# Patient Record
Sex: MALE | Race: WHITE | Employment: OTHER | URBAN - NONMETROPOLITAN AREA
[De-identification: names, ages, dates, MRNs, and addresses within clinical notes are randomized per-mention and may not be internally consistent; named-entity substitution may affect disease eponyms.]

---

## 2017-01-04 ENCOUNTER — OFFICE VISIT (OUTPATIENT)
Dept: CARDIOLOGY | Age: 72
End: 2017-01-04
Payer: MEDICARE

## 2017-01-04 VITALS
HEART RATE: 76 BPM | SYSTOLIC BLOOD PRESSURE: 128 MMHG | HEIGHT: 73 IN | DIASTOLIC BLOOD PRESSURE: 86 MMHG | WEIGHT: 178 LBS | BODY MASS INDEX: 23.59 KG/M2

## 2017-01-04 DIAGNOSIS — I25.10 ASHD (ARTERIOSCLEROTIC HEART DISEASE): Primary | ICD-10-CM

## 2017-01-04 DIAGNOSIS — I10 ESSENTIAL HYPERTENSION: ICD-10-CM

## 2017-01-04 PROCEDURE — 99213 OFFICE O/P EST LOW 20 MIN: CPT | Performed by: INTERNAL MEDICINE

## 2017-01-04 RX ORDER — CETIRIZINE HYDROCHLORIDE 10 MG/1
TABLET ORAL
COMMUNITY
Start: 2016-12-30 | End: 2017-05-10

## 2017-01-04 RX ORDER — AMOXICILLIN AND CLAVULANATE POTASSIUM 875; 125 MG/1; MG/1
TABLET, FILM COATED ORAL
COMMUNITY
Start: 2016-12-30 | End: 2017-05-10

## 2017-01-12 DIAGNOSIS — I25.119 CORONARY ARTERY DISEASE INVOLVING NATIVE CORONARY ARTERY OF NATIVE HEART WITH ANGINA PECTORIS (HCC): Primary | ICD-10-CM

## 2017-01-13 RX ORDER — CLOPIDOGREL BISULFATE 75 MG/1
TABLET ORAL
Qty: 30 TABLET | Refills: 5 | Status: SHIPPED | OUTPATIENT
Start: 2017-01-13 | End: 2017-05-10

## 2017-01-18 ENCOUNTER — TELEPHONE (OUTPATIENT)
Dept: CARDIOLOGY | Age: 72
End: 2017-01-18

## 2017-01-20 ENCOUNTER — TELEPHONE (OUTPATIENT)
Dept: CARDIOLOGY | Age: 72
End: 2017-01-20

## 2017-05-04 DIAGNOSIS — I10 ESSENTIAL HYPERTENSION: ICD-10-CM

## 2017-05-04 RX ORDER — AMLODIPINE BESYLATE 5 MG/1
TABLET ORAL
Qty: 90 TABLET | Refills: 2 | Status: SHIPPED | OUTPATIENT
Start: 2017-05-04 | End: 2018-04-25 | Stop reason: SDUPTHER

## 2017-05-10 ENCOUNTER — APPOINTMENT (OUTPATIENT)
Dept: GENERAL RADIOLOGY | Age: 72
End: 2017-05-10
Payer: MEDICARE

## 2017-05-10 ENCOUNTER — HOSPITAL ENCOUNTER (OUTPATIENT)
Age: 72
Setting detail: OBSERVATION
Discharge: HOME | End: 2017-05-12
Attending: EMERGENCY MEDICINE | Admitting: INTERNAL MEDICINE
Payer: MEDICARE

## 2017-05-10 ENCOUNTER — TELEPHONE (OUTPATIENT)
Dept: CARDIOLOGY | Age: 72
End: 2017-05-10

## 2017-05-10 DIAGNOSIS — I20.0 UNSTABLE ANGINA (HCC): Primary | ICD-10-CM

## 2017-05-10 LAB
ALBUMIN SERPL-MCNC: 4.6 G/DL (ref 3.5–5.2)
ALP BLD-CCNC: 68 U/L (ref 40–130)
ALT SERPL-CCNC: 22 U/L (ref 5–41)
ANION GAP SERPL CALCULATED.3IONS-SCNC: 14 MMOL/L (ref 7–19)
APTT: 31.2 SEC (ref 26–36.2)
AST SERPL-CCNC: 25 U/L (ref 5–40)
BASOPHILS ABSOLUTE: 0 K/UL (ref 0–0.2)
BASOPHILS RELATIVE PERCENT: 0.4 % (ref 0–1)
BILIRUB SERPL-MCNC: 0.6 MG/DL (ref 0.2–1.2)
BUN BLDV-MCNC: 13 MG/DL (ref 8–23)
CALCIUM SERPL-MCNC: 9.6 MG/DL (ref 8.8–10.2)
CHLORIDE BLD-SCNC: 97 MMOL/L (ref 98–111)
CO2: 23 MMOL/L (ref 22–29)
CREAT SERPL-MCNC: 0.7 MG/DL (ref 0.5–1.2)
EOSINOPHILS ABSOLUTE: 0 K/UL (ref 0–0.6)
EOSINOPHILS RELATIVE PERCENT: 0.4 % (ref 0–5)
GFR NON-AFRICAN AMERICAN: >60
GLOBULIN: 2.7 G/DL
GLUCOSE BLD-MCNC: 103 MG/DL (ref 74–109)
HCT VFR BLD CALC: 40.3 % (ref 42–52)
HCT VFR BLD CALC: 42.7 % (ref 42–52)
HEMOGLOBIN: 13.3 G/DL (ref 14–18)
HEMOGLOBIN: 14.1 G/DL (ref 14–18)
LYMPHOCYTES ABSOLUTE: 1.6 K/UL (ref 1.1–4.5)
LYMPHOCYTES RELATIVE PERCENT: 20.3 % (ref 20–40)
MCH RBC QN AUTO: 29.1 PG (ref 27–31)
MCH RBC QN AUTO: 29.4 PG (ref 27–31)
MCHC RBC AUTO-ENTMCNC: 33 G/DL (ref 33–37)
MCHC RBC AUTO-ENTMCNC: 33 G/DL (ref 33–37)
MCV RBC AUTO: 88.2 FL (ref 80–94)
MCV RBC AUTO: 89 FL (ref 80–94)
MONOCYTES ABSOLUTE: 1 K/UL (ref 0–0.9)
MONOCYTES RELATIVE PERCENT: 12.6 % (ref 0–10)
NEUTROPHILS ABSOLUTE: 5.1 K/UL (ref 1.5–7.5)
NEUTROPHILS RELATIVE PERCENT: 65.9 % (ref 50–65)
PDW BLD-RTO: 12.7 % (ref 11.5–14.5)
PDW BLD-RTO: 12.9 % (ref 11.5–14.5)
PERFORMED ON: NORMAL
PLATELET # BLD: 185 K/UL (ref 130–400)
PLATELET # BLD: 212 K/UL (ref 130–400)
PMV BLD AUTO: 10.3 FL (ref 7.4–10.4)
PMV BLD AUTO: 9.8 FL (ref 7.4–10.4)
POC TROPONIN I: 0.02 NG/ML (ref 0–0.08)
POTASSIUM SERPL-SCNC: 4 MMOL/L (ref 3.5–5)
RBC # BLD: 4.53 M/UL (ref 4.7–6.1)
RBC # BLD: 4.84 M/UL (ref 4.7–6.1)
SODIUM BLD-SCNC: 134 MMOL/L (ref 136–145)
TOTAL PROTEIN: 7.3 G/DL (ref 6.6–8.7)
TROPONIN: <0.01 NG/ML (ref 0–0.03)
WBC # BLD: 6.2 K/UL (ref 4.8–10.8)
WBC # BLD: 7.7 K/UL (ref 4.8–10.8)

## 2017-05-10 PROCEDURE — 6360000002 HC RX W HCPCS: Performed by: INTERNAL MEDICINE

## 2017-05-10 PROCEDURE — 99283 EMERGENCY DEPT VISIT LOW MDM: CPT | Performed by: EMERGENCY MEDICINE

## 2017-05-10 PROCEDURE — 99285 EMERGENCY DEPT VISIT HI MDM: CPT

## 2017-05-10 PROCEDURE — 93005 ELECTROCARDIOGRAM TRACING: CPT

## 2017-05-10 PROCEDURE — 85730 THROMBOPLASTIN TIME PARTIAL: CPT

## 2017-05-10 PROCEDURE — 6370000000 HC RX 637 (ALT 250 FOR IP): Performed by: INTERNAL MEDICINE

## 2017-05-10 PROCEDURE — 85025 COMPLETE CBC W/AUTO DIFF WBC: CPT

## 2017-05-10 PROCEDURE — 2140000000 HC CCU INTERMEDIATE R&B

## 2017-05-10 PROCEDURE — 85027 COMPLETE CBC AUTOMATED: CPT

## 2017-05-10 PROCEDURE — 84484 ASSAY OF TROPONIN QUANT: CPT

## 2017-05-10 PROCEDURE — 94762 N-INVAS EAR/PLS OXIMTRY CONT: CPT

## 2017-05-10 PROCEDURE — 6370000000 HC RX 637 (ALT 250 FOR IP): Performed by: EMERGENCY MEDICINE

## 2017-05-10 PROCEDURE — 80053 COMPREHEN METABOLIC PANEL: CPT

## 2017-05-10 PROCEDURE — 71010 XR CHEST PORTABLE: CPT

## 2017-05-10 PROCEDURE — G0378 HOSPITAL OBSERVATION PER HR: HCPCS

## 2017-05-10 PROCEDURE — 2580000003 HC RX 258: Performed by: INTERNAL MEDICINE

## 2017-05-10 PROCEDURE — 36415 COLL VENOUS BLD VENIPUNCTURE: CPT

## 2017-05-10 RX ORDER — ASPIRIN 81 MG/1
324 TABLET, CHEWABLE ORAL ONCE
Status: COMPLETED | OUTPATIENT
Start: 2017-05-10 | End: 2017-05-10

## 2017-05-10 RX ORDER — SODIUM CHLORIDE 0.9 % (FLUSH) 0.9 %
10 SYRINGE (ML) INJECTION EVERY 12 HOURS SCHEDULED
Status: DISCONTINUED | OUTPATIENT
Start: 2017-05-10 | End: 2017-05-11

## 2017-05-10 RX ORDER — ACETAMINOPHEN 325 MG/1
650 TABLET ORAL EVERY 4 HOURS PRN
Status: DISCONTINUED | OUTPATIENT
Start: 2017-05-10 | End: 2017-05-12 | Stop reason: HOSPADM

## 2017-05-10 RX ORDER — NITROGLYCERIN 0.4 MG/1
0.4 TABLET SUBLINGUAL EVERY 5 MIN PRN
Status: DISCONTINUED | OUTPATIENT
Start: 2017-05-10 | End: 2017-05-12 | Stop reason: HOSPADM

## 2017-05-10 RX ORDER — DOCUSATE SODIUM 100 MG/1
100 CAPSULE, LIQUID FILLED ORAL 2 TIMES DAILY
Status: DISCONTINUED | OUTPATIENT
Start: 2017-05-10 | End: 2017-05-12 | Stop reason: HOSPADM

## 2017-05-10 RX ORDER — SODIUM CHLORIDE 9 MG/ML
INJECTION, SOLUTION INTRAVENOUS CONTINUOUS
Status: DISCONTINUED | OUTPATIENT
Start: 2017-05-10 | End: 2017-05-11 | Stop reason: SDUPTHER

## 2017-05-10 RX ORDER — HEPARIN SODIUM 10000 [USP'U]/100ML
12 INJECTION, SOLUTION INTRAVENOUS CONTINUOUS
Status: DISCONTINUED | OUTPATIENT
Start: 2017-05-10 | End: 2017-05-11

## 2017-05-10 RX ORDER — HEPARIN SODIUM 1000 [USP'U]/ML
4000 INJECTION, SOLUTION INTRAVENOUS; SUBCUTANEOUS ONCE
Status: COMPLETED | OUTPATIENT
Start: 2017-05-10 | End: 2017-05-10

## 2017-05-10 RX ORDER — ONDANSETRON 2 MG/ML
4 INJECTION INTRAMUSCULAR; INTRAVENOUS EVERY 6 HOURS PRN
Status: DISCONTINUED | OUTPATIENT
Start: 2017-05-10 | End: 2017-05-12 | Stop reason: HOSPADM

## 2017-05-10 RX ORDER — HEPARIN SODIUM 1000 [USP'U]/ML
2000 INJECTION, SOLUTION INTRAVENOUS; SUBCUTANEOUS PRN
Status: DISCONTINUED | OUTPATIENT
Start: 2017-05-10 | End: 2017-05-11

## 2017-05-10 RX ORDER — SODIUM CHLORIDE 0.9 % (FLUSH) 0.9 %
10 SYRINGE (ML) INJECTION PRN
Status: DISCONTINUED | OUTPATIENT
Start: 2017-05-10 | End: 2017-05-11

## 2017-05-10 RX ORDER — ASPIRIN 81 MG/1
81 TABLET, CHEWABLE ORAL DAILY
Status: DISCONTINUED | OUTPATIENT
Start: 2017-05-11 | End: 2017-05-12 | Stop reason: HOSPADM

## 2017-05-10 RX ORDER — METOPROLOL SUCCINATE 50 MG/1
50 TABLET, EXTENDED RELEASE ORAL DAILY
Status: DISCONTINUED | OUTPATIENT
Start: 2017-05-11 | End: 2017-05-12 | Stop reason: HOSPADM

## 2017-05-10 RX ORDER — HEPARIN SODIUM 1000 [USP'U]/ML
4000 INJECTION, SOLUTION INTRAVENOUS; SUBCUTANEOUS PRN
Status: DISCONTINUED | OUTPATIENT
Start: 2017-05-10 | End: 2017-05-11

## 2017-05-10 RX ORDER — ATORVASTATIN CALCIUM 20 MG/1
20 TABLET, FILM COATED ORAL NIGHTLY
Status: DISCONTINUED | OUTPATIENT
Start: 2017-05-10 | End: 2017-05-12 | Stop reason: HOSPADM

## 2017-05-10 RX ORDER — FERROUS SULFATE 325(65) MG
325 TABLET ORAL 2 TIMES DAILY WITH MEALS
Status: DISCONTINUED | OUTPATIENT
Start: 2017-05-11 | End: 2017-05-12 | Stop reason: HOSPADM

## 2017-05-10 RX ORDER — OMEGA-3 FATTY ACIDS CAP DELAYED RELEASE 1000 MG 1000 MG
2000 CAPSULE DELAYED RELEASE ORAL DAILY
Status: DISCONTINUED | OUTPATIENT
Start: 2017-05-11 | End: 2017-05-12 | Stop reason: HOSPADM

## 2017-05-10 RX ORDER — RAMIPRIL 10 MG/1
10 CAPSULE ORAL 2 TIMES DAILY
Status: DISCONTINUED | OUTPATIENT
Start: 2017-05-10 | End: 2017-05-12 | Stop reason: HOSPADM

## 2017-05-10 RX ORDER — DIPHENHYDRAMINE HCL 25 MG
50 TABLET ORAL NIGHTLY PRN
Status: DISCONTINUED | OUTPATIENT
Start: 2017-05-10 | End: 2017-05-12 | Stop reason: HOSPADM

## 2017-05-10 RX ORDER — AMLODIPINE BESYLATE 5 MG/1
5 TABLET ORAL DAILY
Status: DISCONTINUED | OUTPATIENT
Start: 2017-05-11 | End: 2017-05-12 | Stop reason: HOSPADM

## 2017-05-10 RX ADMIN — ATORVASTATIN CALCIUM 20 MG: 20 TABLET, FILM COATED ORAL at 22:14

## 2017-05-10 RX ADMIN — HEPARIN SODIUM AND DEXTROSE 9.9 ML/HR: 10000; 5 INJECTION INTRAVENOUS at 22:37

## 2017-05-10 RX ADMIN — ASPIRIN 81 MG CHEWABLE TABLET 324 MG: 81 TABLET CHEWABLE at 18:53

## 2017-05-10 RX ADMIN — Medication 10 ML: at 22:16

## 2017-05-10 RX ADMIN — SODIUM CHLORIDE: 9 INJECTION, SOLUTION INTRAVENOUS at 22:20

## 2017-05-10 RX ADMIN — RAMIPRIL 10 MG: 10 CAPSULE ORAL at 22:29

## 2017-05-10 RX ADMIN — DOCUSATE SODIUM 100 MG: 100 CAPSULE, LIQUID FILLED ORAL at 22:14

## 2017-05-10 RX ADMIN — HEPARIN SODIUM 10000 UNITS: 1000 INJECTION, SOLUTION INTRAVENOUS; SUBCUTANEOUS at 22:15

## 2017-05-10 ASSESSMENT — ENCOUNTER SYMPTOMS
SHORTNESS OF BREATH: 1
NAUSEA: 0
VOMITING: 0

## 2017-05-10 ASSESSMENT — PAIN SCALES - GENERAL: PAINLEVEL_OUTOF10: 0

## 2017-05-11 LAB
ANION GAP SERPL CALCULATED.3IONS-SCNC: 12 MMOL/L (ref 7–19)
APTT: 50.9 SEC (ref 26–36.2)
APTT: 52.4 SEC (ref 26–36.2)
APTT: 52.4 SEC (ref 26–36.2)
APTT: 52.8 SEC (ref 26–36.2)
BASOPHILS ABSOLUTE: 0 K/UL (ref 0–0.2)
BASOPHILS RELATIVE PERCENT: 0.5 % (ref 0–1)
BUN BLDV-MCNC: 13 MG/DL (ref 8–23)
CALCIUM SERPL-MCNC: 8.9 MG/DL (ref 8.8–10.2)
CHLORIDE BLD-SCNC: 102 MMOL/L (ref 98–111)
CO2: 25 MMOL/L (ref 22–29)
CREAT SERPL-MCNC: 0.7 MG/DL (ref 0.5–1.2)
EOSINOPHILS ABSOLUTE: 0.1 K/UL (ref 0–0.6)
EOSINOPHILS RELATIVE PERCENT: 2 % (ref 0–5)
GFR NON-AFRICAN AMERICAN: >60
GLUCOSE BLD-MCNC: 103 MG/DL (ref 74–109)
HCT VFR BLD CALC: 39.4 % (ref 42–52)
HEMOGLOBIN: 12.8 G/DL (ref 14–18)
INR BLD: 1.11 (ref 0.88–1.18)
LYMPHOCYTES ABSOLUTE: 1.7 K/UL (ref 1.1–4.5)
LYMPHOCYTES RELATIVE PERCENT: 26.5 % (ref 20–40)
MCH RBC QN AUTO: 29.2 PG (ref 27–31)
MCHC RBC AUTO-ENTMCNC: 32.5 G/DL (ref 33–37)
MCV RBC AUTO: 90 FL (ref 80–94)
MONOCYTES ABSOLUTE: 0.8 K/UL (ref 0–0.9)
MONOCYTES RELATIVE PERCENT: 12.1 % (ref 0–10)
NEUTROPHILS ABSOLUTE: 3.8 K/UL (ref 1.5–7.5)
NEUTROPHILS RELATIVE PERCENT: 58.6 % (ref 50–65)
PDW BLD-RTO: 13.1 % (ref 11.5–14.5)
PLATELET # BLD: 180 K/UL (ref 130–400)
PMV BLD AUTO: 10.5 FL (ref 7.4–10.4)
POC ACT LR: 118 SEC
POC ACT LR: 121 SEC
POC ACT LR: 158 SEC
POC ACT LR: 300 SEC
POTASSIUM SERPL-SCNC: 4 MMOL/L (ref 3.5–5)
PROTHROMBIN TIME: 14.2 SEC (ref 12–14.6)
RBC # BLD: 4.38 M/UL (ref 4.7–6.1)
SODIUM BLD-SCNC: 139 MMOL/L (ref 136–145)
TROPONIN: <0.01 NG/ML (ref 0–0.03)
TROPONIN: <0.01 NG/ML (ref 0–0.03)
WBC # BLD: 6.4 K/UL (ref 4.8–10.8)

## 2017-05-11 PROCEDURE — 85730 THROMBOPLASTIN TIME PARTIAL: CPT

## 2017-05-11 PROCEDURE — 85025 COMPLETE CBC W/AUTO DIFF WBC: CPT

## 2017-05-11 PROCEDURE — C1874 STENT, COATED/COV W/DEL SYS: HCPCS

## 2017-05-11 PROCEDURE — 2580000003 HC RX 258: Performed by: INTERNAL MEDICINE

## 2017-05-11 PROCEDURE — C1769 GUIDE WIRE: HCPCS

## 2017-05-11 PROCEDURE — 92928 PRQ TCAT PLMT NTRAC ST 1 LES: CPT | Performed by: INTERNAL MEDICINE

## 2017-05-11 PROCEDURE — 2720000000 HC MISC SURG SUPPLY STERILE $0-50

## 2017-05-11 PROCEDURE — 6370000000 HC RX 637 (ALT 250 FOR IP): Performed by: INTERNAL MEDICINE

## 2017-05-11 PROCEDURE — 84484 ASSAY OF TROPONIN QUANT: CPT

## 2017-05-11 PROCEDURE — 80061 LIPID PANEL: CPT

## 2017-05-11 PROCEDURE — G0378 HOSPITAL OBSERVATION PER HR: HCPCS

## 2017-05-11 PROCEDURE — 2720000001 HC MISC SURG SUPPLY STERILE $51-500

## 2017-05-11 PROCEDURE — C1894 INTRO/SHEATH, NON-LASER: HCPCS

## 2017-05-11 PROCEDURE — 85347 COAGULATION TIME ACTIVATED: CPT

## 2017-05-11 PROCEDURE — 99220 PR INITIAL OBSERVATION CARE/DAY 70 MINUTES: CPT | Performed by: INTERNAL MEDICINE

## 2017-05-11 PROCEDURE — 2709999900 HC NON-CHARGEABLE SUPPLY

## 2017-05-11 PROCEDURE — C1725 CATH, TRANSLUMIN NON-LASER: HCPCS

## 2017-05-11 PROCEDURE — 93005 ELECTROCARDIOGRAM TRACING: CPT

## 2017-05-11 PROCEDURE — 36415 COLL VENOUS BLD VENIPUNCTURE: CPT

## 2017-05-11 PROCEDURE — 94762 N-INVAS EAR/PLS OXIMTRY CONT: CPT

## 2017-05-11 PROCEDURE — 2500000003 HC RX 250 WO HCPCS

## 2017-05-11 PROCEDURE — 80048 BASIC METABOLIC PNL TOTAL CA: CPT

## 2017-05-11 PROCEDURE — 2140000000 HC CCU INTERMEDIATE R&B

## 2017-05-11 PROCEDURE — 93458 L HRT ARTERY/VENTRICLE ANGIO: CPT | Performed by: INTERNAL MEDICINE

## 2017-05-11 PROCEDURE — 85610 PROTHROMBIN TIME: CPT

## 2017-05-11 PROCEDURE — C1887 CATHETER, GUIDING: HCPCS

## 2017-05-11 PROCEDURE — 6370000000 HC RX 637 (ALT 250 FOR IP)

## 2017-05-11 PROCEDURE — 6360000002 HC RX W HCPCS

## 2017-05-11 RX ORDER — SODIUM CHLORIDE 9 MG/ML
INJECTION, SOLUTION INTRAVENOUS CONTINUOUS
Status: DISCONTINUED | OUTPATIENT
Start: 2017-05-11 | End: 2017-05-11

## 2017-05-11 RX ORDER — SODIUM CHLORIDE 0.9 % (FLUSH) 0.9 %
10 SYRINGE (ML) INJECTION PRN
Status: DISCONTINUED | OUTPATIENT
Start: 2017-05-11 | End: 2017-05-12 | Stop reason: HOSPADM

## 2017-05-11 RX ORDER — SODIUM CHLORIDE 9 MG/ML
INJECTION, SOLUTION INTRAVENOUS CONTINUOUS
Status: DISCONTINUED | OUTPATIENT
Start: 2017-05-11 | End: 2017-05-12 | Stop reason: HOSPADM

## 2017-05-11 RX ORDER — SODIUM CHLORIDE 0.9 % (FLUSH) 0.9 %
10 SYRINGE (ML) INJECTION EVERY 12 HOURS SCHEDULED
Status: DISCONTINUED | OUTPATIENT
Start: 2017-05-11 | End: 2017-05-12 | Stop reason: HOSPADM

## 2017-05-11 RX ORDER — CLOPIDOGREL BISULFATE 75 MG/1
75 TABLET ORAL DAILY
Status: DISCONTINUED | OUTPATIENT
Start: 2017-05-12 | End: 2017-05-12 | Stop reason: HOSPADM

## 2017-05-11 RX ADMIN — AMLODIPINE BESYLATE 5 MG: 5 TABLET ORAL at 08:40

## 2017-05-11 RX ADMIN — DOCUSATE SODIUM 100 MG: 100 CAPSULE, LIQUID FILLED ORAL at 20:33

## 2017-05-11 RX ADMIN — RAMIPRIL 10 MG: 10 CAPSULE ORAL at 20:37

## 2017-05-11 RX ADMIN — ASPIRIN 81 MG CHEWABLE TABLET 81 MG: 81 TABLET CHEWABLE at 08:40

## 2017-05-11 RX ADMIN — Medication 10 ML: at 20:34

## 2017-05-11 RX ADMIN — ATORVASTATIN CALCIUM 20 MG: 20 TABLET, FILM COATED ORAL at 20:34

## 2017-05-11 RX ADMIN — RAMIPRIL 10 MG: 10 CAPSULE ORAL at 08:40

## 2017-05-11 RX ADMIN — OMEGA-3 FATTY ACIDS CAP DELAYED RELEASE 1000 MG 2000 MG: 1000 CAPSULE DELAYED RELEASE at 08:40

## 2017-05-11 RX ADMIN — METOPROLOL SUCCINATE 50 MG: 50 TABLET, EXTENDED RELEASE ORAL at 08:40

## 2017-05-11 RX ADMIN — Medication 10 ML: at 08:41

## 2017-05-11 RX ADMIN — SODIUM CHLORIDE: 9 INJECTION, SOLUTION INTRAVENOUS at 20:34

## 2017-05-11 ASSESSMENT — PAIN SCALES - GENERAL
PAINLEVEL_OUTOF10: 0
PAINLEVEL_OUTOF10: 0

## 2017-05-11 ASSESSMENT — ENCOUNTER SYMPTOMS: SHORTNESS OF BREATH: 1

## 2017-05-12 VITALS
DIASTOLIC BLOOD PRESSURE: 86 MMHG | BODY MASS INDEX: 24.16 KG/M2 | HEIGHT: 72 IN | SYSTOLIC BLOOD PRESSURE: 130 MMHG | WEIGHT: 178.4 LBS | RESPIRATION RATE: 16 BRPM | TEMPERATURE: 98.1 F | OXYGEN SATURATION: 94 % | HEART RATE: 68 BPM

## 2017-05-12 LAB
ANION GAP SERPL CALCULATED.3IONS-SCNC: 13 MMOL/L (ref 7–19)
BASOPHILS ABSOLUTE: 0 K/UL (ref 0–0.2)
BASOPHILS RELATIVE PERCENT: 0.5 % (ref 0–1)
BUN BLDV-MCNC: 14 MG/DL (ref 8–23)
CALCIUM SERPL-MCNC: 8.4 MG/DL (ref 8.8–10.2)
CHLORIDE BLD-SCNC: 105 MMOL/L (ref 98–111)
CHOLESTEROL, TOTAL: 147 MG/DL (ref 160–199)
CO2: 23 MMOL/L (ref 22–29)
CREAT SERPL-MCNC: 0.7 MG/DL (ref 0.5–1.2)
EKG P AXIS: 21 DEGREES
EKG P AXIS: 21 DEGREES
EKG P-R INTERVAL: 168 MS
EKG P-R INTERVAL: 178 MS
EKG Q-T INTERVAL: 386 MS
EKG Q-T INTERVAL: 414 MS
EKG QRS DURATION: 88 MS
EKG QRS DURATION: 90 MS
EKG QTC CALCULATION (BAZETT): 401 MS
EKG QTC CALCULATION (BAZETT): 415 MS
EKG T AXIS: 23 DEGREES
EKG T AXIS: 38 DEGREES
EOSINOPHILS ABSOLUTE: 0.2 K/UL (ref 0–0.6)
EOSINOPHILS RELATIVE PERCENT: 2.5 % (ref 0–5)
GFR NON-AFRICAN AMERICAN: >60
GLUCOSE BLD-MCNC: 98 MG/DL (ref 74–109)
HCT VFR BLD CALC: 37.1 % (ref 42–52)
HDLC SERPL-MCNC: 63 MG/DL (ref 55–121)
HEMOGLOBIN: 12 G/DL (ref 14–18)
LDL CHOLESTEROL CALCULATED: 67 MG/DL
LYMPHOCYTES ABSOLUTE: 1.2 K/UL (ref 1.1–4.5)
LYMPHOCYTES RELATIVE PERCENT: 16 % (ref 20–40)
MCH RBC QN AUTO: 29.4 PG (ref 27–31)
MCHC RBC AUTO-ENTMCNC: 32.3 G/DL (ref 33–37)
MCV RBC AUTO: 90.9 FL (ref 80–94)
MONOCYTES ABSOLUTE: 0.7 K/UL (ref 0–0.9)
MONOCYTES RELATIVE PERCENT: 9.8 % (ref 0–10)
NEUTROPHILS ABSOLUTE: 5.4 K/UL (ref 1.5–7.5)
NEUTROPHILS RELATIVE PERCENT: 70.7 % (ref 50–65)
PDW BLD-RTO: 13.3 % (ref 11.5–14.5)
PLATELET # BLD: 167 K/UL (ref 130–400)
PLATELET # BLD: 172 K/UL (ref 130–400)
PMV BLD AUTO: 10 FL (ref 7.4–10.4)
POTASSIUM SERPL-SCNC: 4 MMOL/L (ref 3.5–5)
RBC # BLD: 4.08 M/UL (ref 4.7–6.1)
SODIUM BLD-SCNC: 141 MMOL/L (ref 136–145)
TRIGL SERPL-MCNC: 86 MG/DL (ref 150–199)
WBC # BLD: 7.6 K/UL (ref 4.8–10.8)

## 2017-05-12 PROCEDURE — 2580000003 HC RX 258: Performed by: INTERNAL MEDICINE

## 2017-05-12 PROCEDURE — 94762 N-INVAS EAR/PLS OXIMTRY CONT: CPT

## 2017-05-12 PROCEDURE — 85025 COMPLETE CBC W/AUTO DIFF WBC: CPT

## 2017-05-12 PROCEDURE — 6370000000 HC RX 637 (ALT 250 FOR IP): Performed by: INTERNAL MEDICINE

## 2017-05-12 PROCEDURE — 85049 AUTOMATED PLATELET COUNT: CPT

## 2017-05-12 PROCEDURE — 99217 PR OBSERVATION CARE DISCHARGE MANAGEMENT: CPT | Performed by: INTERNAL MEDICINE

## 2017-05-12 PROCEDURE — 80048 BASIC METABOLIC PNL TOTAL CA: CPT

## 2017-05-12 PROCEDURE — G0378 HOSPITAL OBSERVATION PER HR: HCPCS

## 2017-05-12 PROCEDURE — 36415 COLL VENOUS BLD VENIPUNCTURE: CPT

## 2017-05-12 RX ORDER — CLOPIDOGREL BISULFATE 75 MG/1
75 TABLET ORAL DAILY
Qty: 30 TABLET | Refills: 3 | Status: SHIPPED | OUTPATIENT
Start: 2017-05-12 | End: 2017-10-10 | Stop reason: SDUPTHER

## 2017-05-12 RX ADMIN — RAMIPRIL 10 MG: 10 CAPSULE ORAL at 08:43

## 2017-05-12 RX ADMIN — Medication 10 ML: at 08:44

## 2017-05-12 RX ADMIN — METOPROLOL SUCCINATE 50 MG: 50 TABLET, EXTENDED RELEASE ORAL at 08:43

## 2017-05-12 RX ADMIN — ASPIRIN 81 MG CHEWABLE TABLET 81 MG: 81 TABLET CHEWABLE at 08:44

## 2017-05-12 RX ADMIN — AMLODIPINE BESYLATE 5 MG: 5 TABLET ORAL at 08:44

## 2017-05-12 RX ADMIN — OMEGA-3 FATTY ACIDS CAP DELAYED RELEASE 1000 MG 2000 MG: 1000 CAPSULE DELAYED RELEASE at 08:43

## 2017-05-12 RX ADMIN — CLOPIDOGREL BISULFATE 75 MG: 75 TABLET ORAL at 08:44

## 2017-05-12 ASSESSMENT — PAIN SCALES - GENERAL
PAINLEVEL_OUTOF10: 0

## 2017-06-09 DIAGNOSIS — I10 ESSENTIAL HYPERTENSION: ICD-10-CM

## 2017-06-12 RX ORDER — RAMIPRIL 10 MG/1
CAPSULE ORAL
Qty: 90 CAPSULE | Refills: 2 | Status: SHIPPED | OUTPATIENT
Start: 2017-06-12 | End: 2017-09-11 | Stop reason: SDUPTHER

## 2017-06-19 ENCOUNTER — OFFICE VISIT (OUTPATIENT)
Dept: CARDIOLOGY | Age: 72
End: 2017-06-19
Payer: MEDICARE

## 2017-06-19 VITALS
HEIGHT: 73 IN | WEIGHT: 186 LBS | DIASTOLIC BLOOD PRESSURE: 70 MMHG | HEART RATE: 71 BPM | SYSTOLIC BLOOD PRESSURE: 126 MMHG | BODY MASS INDEX: 24.65 KG/M2

## 2017-06-19 DIAGNOSIS — I10 ESSENTIAL HYPERTENSION: Primary | ICD-10-CM

## 2017-06-19 PROCEDURE — 93000 ELECTROCARDIOGRAM COMPLETE: CPT | Performed by: INTERNAL MEDICINE

## 2017-06-19 PROCEDURE — 99214 OFFICE O/P EST MOD 30 MIN: CPT | Performed by: INTERNAL MEDICINE

## 2017-06-19 ASSESSMENT — ENCOUNTER SYMPTOMS: SHORTNESS OF BREATH: 0

## 2017-06-29 ENCOUNTER — TELEPHONE (OUTPATIENT)
Dept: CARDIOLOGY | Age: 72
End: 2017-06-29

## 2017-08-02 DIAGNOSIS — I10 ESSENTIAL HYPERTENSION: ICD-10-CM

## 2017-08-02 RX ORDER — METOPROLOL SUCCINATE 50 MG/1
TABLET, EXTENDED RELEASE ORAL
Qty: 90 TABLET | Refills: 3 | Status: SHIPPED | OUTPATIENT
Start: 2017-08-02 | End: 2018-07-28 | Stop reason: SDUPTHER

## 2017-09-11 DIAGNOSIS — I10 ESSENTIAL HYPERTENSION: ICD-10-CM

## 2017-09-11 RX ORDER — RAMIPRIL 10 MG/1
CAPSULE ORAL
Qty: 60 CAPSULE | Refills: 5 | Status: SHIPPED | OUTPATIENT
Start: 2017-09-11 | End: 2018-03-12 | Stop reason: SDUPTHER

## 2017-10-13 RX ORDER — CLOPIDOGREL BISULFATE 75 MG/1
TABLET ORAL
Qty: 30 TABLET | Refills: 2 | Status: SHIPPED | OUTPATIENT
Start: 2017-10-13 | End: 2018-01-16 | Stop reason: SDUPTHER

## 2017-10-16 ENCOUNTER — OFFICE VISIT (OUTPATIENT)
Dept: CARDIOLOGY | Age: 72
End: 2017-10-16
Payer: MEDICARE

## 2017-10-16 VITALS
HEIGHT: 73 IN | WEIGHT: 180 LBS | SYSTOLIC BLOOD PRESSURE: 138 MMHG | DIASTOLIC BLOOD PRESSURE: 76 MMHG | BODY MASS INDEX: 23.86 KG/M2 | HEART RATE: 70 BPM

## 2017-10-16 DIAGNOSIS — I10 ESSENTIAL HYPERTENSION: Primary | ICD-10-CM

## 2017-10-16 PROCEDURE — 93000 ELECTROCARDIOGRAM COMPLETE: CPT | Performed by: INTERNAL MEDICINE

## 2017-10-16 PROCEDURE — 99214 OFFICE O/P EST MOD 30 MIN: CPT | Performed by: INTERNAL MEDICINE

## 2017-10-16 ASSESSMENT — ENCOUNTER SYMPTOMS: SHORTNESS OF BREATH: 0

## 2017-10-16 NOTE — PROGRESS NOTES
He is feeling well. No pains. He will stop the plavix May 12th. BP has been well he does not check it at home.

## 2017-10-16 NOTE — PROGRESS NOTES
Dear Digna Noland MD     Thank you for allowing me to participate in the care of Mr. Adriana Bustamante. He presents today at the [address] [place] [address] in the [facility]. As you know, Mr. Kaitlynn Landaverde is a 70 y.o. male with history of hypertension, hyperlipidemia, MI who presents with the chief complaint of follow-up for chronic cardiac condition. Since last being seen, he's not had any chest pain. He's compliant with his medications. He is active. His blood pressure is well-controlled. His cholesterol is being monitored by his PCP. He otherwise denies chest pain, SOA, NICHOLS, PND, orthopnea, syncope or near syncope. He has no other complaints. Review of Systems   Constitutional: Negative for malaise/fatigue. Respiratory: Negative for shortness of breath. Cardiovascular: Negative for chest pain. Neurological: Negative for weakness. All other systems reviewed and are negative. Past Medical History:   Diagnosis Date    CAD (coronary artery disease)     Hyperlipidemia     Dr. Ulysses Crawford Hypertension     subendocardial    Myocardial infarction     Tobacco abuse        Past Surgical History:   Procedure Laterality Date    CARDIAC CATHETERIZATION  2002    EF in excess of 50%    CARDIAC CATHETERIZATION  12/06/2016    Two bare metal stents to right coronary artery. Keagan Ashley M.D.   [id number] [facility]  03/25/02       History reviewed. No pertinent family history. Social History     Social History    Marital status:      Spouse name: N/A    Number of children: N/A    Years of education: N/A     Occupational History    Not on file. Social History Main Topics    Smoking status: Former Smoker     Quit date: 5/1/2012    Smokeless tobacco: Current User     Types: Chew    Alcohol use 0.0 oz/week      Comment: OCC.     Drug use: No    Sexual activity: Not on file     Other Topics Concern    Not on file     Social History Narrative    No narrative on file       No Known Allergies      Current Outpatient Prescriptions:     clopidogrel (PLAVIX) 75 MG tablet, TAKE ONE TABLET BY MOUTH ONE TIME DAILY , Disp: 30 tablet, Rfl: 2    ramipril (ALTACE) 10 MG capsule, TAKE ONE CAPSULE BY MOUTH TWICE DAILY , Disp: 60 capsule, Rfl: 5    metoprolol succinate (TOPROL XL) 50 MG extended release tablet, TAKE ONE TABLET BY MOUTH ONE TIME DAILY , Disp: 90 tablet, Rfl: 3    amLODIPine (NORVASC) 5 MG tablet, take 1 tablet once daily, Disp: 90 tablet, Rfl: 2    nitroGLYCERIN (NITROSTAT) 0.4 MG SL tablet, Dissolve 1 tablet under tongue for chest pain and repeat every 5 min up to max of 3 total doses. If no relief after 3 doses call 911, Disp: 25 tablet, Rfl: 3    rosuvastatin (CRESTOR) 20 MG tablet, Take 1 tablet by mouth every evening (Patient taking differently: Take 20 mg by mouth every other day nightly), Disp: 30 tablet, Rfl: 3    DiphenhydrAMINE HCl, Sleep, (EQ SLEEP AID) 50 MG CAPS, Take  by mouth.  , Disp: , Rfl:     aspirin 81 MG EC tablet, Take 81 mg by mouth daily. , Disp: , Rfl:     fish oil-omega-3 fatty acids 1000 MG capsule, Take 2 g by mouth daily. , Disp: , Rfl:     PE:  Vitals:    10/16/17 1009   BP: 138/76   Pulse: 70       Estimated body mass index is 23.75 kg/m² as calculated from the following:    Height as of this encounter: 6' 1\" (1.854 m). Weight as of this encounter: 180 lb (81.6 kg).     General - No acute distress  Eyes - PERRL, anicteric sclerae; no lid-lag  ENMT - Atraumatic; Mucous membranes moist, oropharynx clear  Neck - trachea midline, thyroid non-tender  Cardio - No jugular venous distension                Clear s1 s2, no gallop, rub, murmur                 No edema, normal pulses  Resp - Normal effort, Clear to auscultation bilaterally  GI - abdomen soft, non-tender, no hepatosplenomegaly  Skin - warm and dry; no rashes  Psych - A+O x 3, normal affect    Lab Results Component Value Date    CREATININE 0.7 05/12/2017    CREATININE 0.7 05/11/2017    CREATININE 0.7 05/10/2017    HGB 12.0 05/12/2017    HGB 12.8 05/11/2017    HGB 13.3 05/10/2017       ECG 10/16/17  Normal sinus rhythm Cath 5/11/17  LMCA: The left main arises from the L coronary cusp, bifurcates into the LAD  and LCX, and is angiographically normal.    LAD: The Left Anterior Descending is a large size vessel that supplies  diagonal branches and wraps around the apex. There 50% stenosis of the mid  LAD at the junction of the first diagonal. The rest of the vessel shows mild  luminal irregularities. LCx: The Circumflex Artery is a moderate size vessel that supplies obtuse  marginal branches. There are mild luminal regularities from the vessel. RCA: The right coronary artery arises from the R coronary cusp. It is a  dominant system. The vessel is mildly calcified specifically in the proximal  segment. Angiographically, the vessel has moderate diffuse disease in the  early mid segment with a 50% tubular stenosis and a 99% in-stent restenosis  of the mid RCA. Highly tortuous vessel. The rest of vessel has the rest of  the vessel including the RPDA has mild luminal irregularities . Assessment, Recommendations, & Plan:  70 y.o. male with CAD, hypertension, hyperlipidemia    CAD - continue dual antiplatelet therapy, and statin, no changes, continue dual antiplatelet therapy until May 11, 2018    Hypertension - well controlled, no changes    Hyperlipidemia - on statin, followed by PCP, no changes    Disposition - RTC in 6 months or sooner if needed    Thank you very much for allowing me to participate in this patient's care. Please do not hesitate to contact me for any questions or concerns. Sincerely yours,    Elio Rosas MD, MSc  Structural Heart Disease Interventions  [address] [facility]

## 2017-11-22 RX ORDER — ROSUVASTATIN CALCIUM 20 MG/1
TABLET, COATED ORAL
Qty: 90 TABLET | Refills: 3 | Status: SHIPPED | OUTPATIENT
Start: 2017-11-22 | End: 2018-11-21 | Stop reason: SDUPTHER

## 2018-01-16 RX ORDER — CLOPIDOGREL BISULFATE 75 MG/1
TABLET ORAL
Qty: 30 TABLET | Refills: 1 | Status: SHIPPED | OUTPATIENT
Start: 2018-01-16 | End: 2018-03-28 | Stop reason: SDUPTHER

## 2018-03-12 DIAGNOSIS — I10 ESSENTIAL HYPERTENSION: ICD-10-CM

## 2018-03-12 RX ORDER — RAMIPRIL 10 MG/1
CAPSULE ORAL
Qty: 60 CAPSULE | Refills: 5 | Status: SHIPPED | OUTPATIENT
Start: 2018-03-12 | End: 2018-09-11 | Stop reason: SDUPTHER

## 2018-03-28 RX ORDER — CLOPIDOGREL BISULFATE 75 MG/1
TABLET ORAL
Qty: 30 TABLET | Refills: 5 | Status: SHIPPED | OUTPATIENT
Start: 2018-03-28 | End: 2019-05-06 | Stop reason: ALTCHOICE

## 2018-04-16 ENCOUNTER — OFFICE VISIT (OUTPATIENT)
Dept: CARDIOLOGY | Age: 73
End: 2018-04-16
Payer: MEDICARE

## 2018-04-16 VITALS
DIASTOLIC BLOOD PRESSURE: 82 MMHG | HEART RATE: 82 BPM | SYSTOLIC BLOOD PRESSURE: 120 MMHG | HEIGHT: 73 IN | BODY MASS INDEX: 23.59 KG/M2 | WEIGHT: 178 LBS

## 2018-04-16 DIAGNOSIS — I25.10 CORONARY ARTERY DISEASE INVOLVING NATIVE CORONARY ARTERY OF NATIVE HEART WITHOUT ANGINA PECTORIS: Primary | ICD-10-CM

## 2018-04-16 DIAGNOSIS — I10 ESSENTIAL HYPERTENSION: ICD-10-CM

## 2018-04-16 DIAGNOSIS — E78.2 MIXED HYPERLIPIDEMIA: ICD-10-CM

## 2018-04-16 PROCEDURE — G8420 CALC BMI NORM PARAMETERS: HCPCS | Performed by: NURSE PRACTITIONER

## 2018-04-16 PROCEDURE — 99213 OFFICE O/P EST LOW 20 MIN: CPT | Performed by: NURSE PRACTITIONER

## 2018-04-16 PROCEDURE — 4040F PNEUMOC VAC/ADMIN/RCVD: CPT | Performed by: NURSE PRACTITIONER

## 2018-04-16 PROCEDURE — G8427 DOCREV CUR MEDS BY ELIG CLIN: HCPCS | Performed by: NURSE PRACTITIONER

## 2018-04-16 PROCEDURE — G8598 ASA/ANTIPLAT THER USED: HCPCS | Performed by: NURSE PRACTITIONER

## 2018-04-16 PROCEDURE — 4004F PT TOBACCO SCREEN RCVD TLK: CPT | Performed by: NURSE PRACTITIONER

## 2018-04-16 PROCEDURE — 1123F ACP DISCUSS/DSCN MKR DOCD: CPT | Performed by: NURSE PRACTITIONER

## 2018-04-16 PROCEDURE — 93000 ELECTROCARDIOGRAM COMPLETE: CPT | Performed by: NURSE PRACTITIONER

## 2018-04-16 PROCEDURE — 3017F COLORECTAL CA SCREEN DOC REV: CPT | Performed by: NURSE PRACTITIONER

## 2018-04-25 DIAGNOSIS — I10 ESSENTIAL HYPERTENSION: ICD-10-CM

## 2018-04-25 RX ORDER — AMLODIPINE BESYLATE 5 MG/1
TABLET ORAL
Qty: 90 TABLET | Refills: 3 | Status: SHIPPED | OUTPATIENT
Start: 2018-04-25 | End: 2019-04-16 | Stop reason: SDUPTHER

## 2018-07-28 DIAGNOSIS — I10 ESSENTIAL HYPERTENSION: ICD-10-CM

## 2018-07-30 RX ORDER — METOPROLOL SUCCINATE 50 MG/1
TABLET, EXTENDED RELEASE ORAL
Qty: 90 TABLET | Refills: 3 | Status: SHIPPED | OUTPATIENT
Start: 2018-07-30 | End: 2019-07-15 | Stop reason: SDUPTHER

## 2018-09-11 DIAGNOSIS — I10 ESSENTIAL HYPERTENSION: ICD-10-CM

## 2018-09-11 RX ORDER — RAMIPRIL 10 MG/1
CAPSULE ORAL
Qty: 60 CAPSULE | Refills: 5 | Status: SHIPPED | OUTPATIENT
Start: 2018-09-11 | End: 2019-03-15 | Stop reason: SDUPTHER

## 2018-10-31 ENCOUNTER — OFFICE VISIT (OUTPATIENT)
Dept: CARDIOLOGY | Age: 73
End: 2018-10-31
Payer: MEDICARE

## 2018-10-31 VITALS
HEART RATE: 82 BPM | HEIGHT: 73 IN | DIASTOLIC BLOOD PRESSURE: 80 MMHG | SYSTOLIC BLOOD PRESSURE: 152 MMHG | WEIGHT: 177.8 LBS | BODY MASS INDEX: 23.56 KG/M2

## 2018-10-31 DIAGNOSIS — E78.2 MIXED HYPERLIPIDEMIA: ICD-10-CM

## 2018-10-31 DIAGNOSIS — I10 ESSENTIAL HYPERTENSION: ICD-10-CM

## 2018-10-31 DIAGNOSIS — I25.10 CORONARY ARTERY DISEASE INVOLVING NATIVE CORONARY ARTERY OF NATIVE HEART WITHOUT ANGINA PECTORIS: Primary | ICD-10-CM

## 2018-10-31 PROCEDURE — G8420 CALC BMI NORM PARAMETERS: HCPCS | Performed by: NURSE PRACTITIONER

## 2018-10-31 PROCEDURE — 4004F PT TOBACCO SCREEN RCVD TLK: CPT | Performed by: NURSE PRACTITIONER

## 2018-10-31 PROCEDURE — G8484 FLU IMMUNIZE NO ADMIN: HCPCS | Performed by: NURSE PRACTITIONER

## 2018-10-31 PROCEDURE — 99213 OFFICE O/P EST LOW 20 MIN: CPT | Performed by: NURSE PRACTITIONER

## 2018-10-31 PROCEDURE — G8598 ASA/ANTIPLAT THER USED: HCPCS | Performed by: NURSE PRACTITIONER

## 2018-10-31 PROCEDURE — 1101F PT FALLS ASSESS-DOCD LE1/YR: CPT | Performed by: NURSE PRACTITIONER

## 2018-10-31 PROCEDURE — 4040F PNEUMOC VAC/ADMIN/RCVD: CPT | Performed by: NURSE PRACTITIONER

## 2018-10-31 PROCEDURE — G8427 DOCREV CUR MEDS BY ELIG CLIN: HCPCS | Performed by: NURSE PRACTITIONER

## 2018-10-31 PROCEDURE — 1123F ACP DISCUSS/DSCN MKR DOCD: CPT | Performed by: NURSE PRACTITIONER

## 2018-10-31 PROCEDURE — 3017F COLORECTAL CA SCREEN DOC REV: CPT | Performed by: NURSE PRACTITIONER

## 2018-11-21 RX ORDER — ROSUVASTATIN CALCIUM 20 MG/1
TABLET, COATED ORAL
Qty: 90 TABLET | Refills: 0 | Status: SHIPPED | OUTPATIENT
Start: 2018-11-21 | End: 2019-03-25 | Stop reason: SDUPTHER

## 2019-03-15 DIAGNOSIS — I10 ESSENTIAL HYPERTENSION: ICD-10-CM

## 2019-03-15 RX ORDER — RAMIPRIL 10 MG/1
CAPSULE ORAL
Qty: 60 CAPSULE | Refills: 2 | Status: SHIPPED | OUTPATIENT
Start: 2019-03-15 | End: 2019-06-15 | Stop reason: SDUPTHER

## 2019-03-18 DIAGNOSIS — E78.2 MIXED HYPERLIPIDEMIA: Primary | ICD-10-CM

## 2019-03-19 DIAGNOSIS — E78.2 MIXED HYPERLIPIDEMIA: ICD-10-CM

## 2019-03-25 ENCOUNTER — TELEPHONE (OUTPATIENT)
Dept: CARDIOLOGY | Age: 74
End: 2019-03-25

## 2019-03-25 RX ORDER — ROSUVASTATIN CALCIUM 20 MG/1
TABLET, COATED ORAL
Qty: 90 TABLET | Refills: 3 | Status: SHIPPED | OUTPATIENT
Start: 2019-03-25 | End: 2019-05-02 | Stop reason: SDUPTHER

## 2019-04-16 DIAGNOSIS — I10 ESSENTIAL HYPERTENSION: ICD-10-CM

## 2019-04-16 RX ORDER — AMLODIPINE BESYLATE 5 MG/1
TABLET ORAL
Qty: 90 TABLET | Refills: 2 | Status: SHIPPED | OUTPATIENT
Start: 2019-04-16 | End: 2019-05-06 | Stop reason: ALTCHOICE

## 2019-05-01 ENCOUNTER — TELEPHONE (OUTPATIENT)
Dept: CARDIOLOGY | Age: 74
End: 2019-05-01

## 2019-05-01 NOTE — TELEPHONE ENCOUNTER
Called and spoke to patient, let him know that [facility] was out of the office today but I would let her know that he called and return his call upon her return. He said that he was been trying to get these lab results for a while now. Looking in the chart it seems that Davide was to call the patient in March and let him know the results and that he needs to repeat labs in 6-8 weeks. Patient says he never received a call. Patient said he would repeat labs when he comes to his visit with [facility] next week. He said he has not been taking his statin.

## 2019-05-01 NOTE — TELEPHONE ENCOUNTER
Jami Baez would like a call to discuss his Labs results.  his preferred call back time is Time; of day: no particular time of day

## 2019-05-02 ENCOUNTER — TELEPHONE (OUTPATIENT)
Dept: CARDIOLOGY | Age: 74
End: 2019-05-02

## 2019-05-02 NOTE — TELEPHONE ENCOUNTER
It looks like Davide scanned in the labs and made a telephone note but never routed the note to anyone. If you look at the telephone encounter it states no routing history. Therefore no provider ever reviewed the labs. He needs to be on statin therapy. His TC was 304, , HDL 81, . His TC needs to be <160 and his LDL needs to be 70 or less. We have listed he is on Crestor 20 mg daily. How long has he been off of his statin? Why did he stop it? If he was having muscle aches, could try taking CoQ10 with it to see if this helps him tolerate it if that is the reason for his discontinuation.      Keep follow up

## 2019-05-02 NOTE — TELEPHONE ENCOUNTER
Pt called stating his chol med was sent to wrong pharm and wanted it changed to [facility] in [facility]. New script sent for Mrs. Wanda Walden to sign and send.

## 2019-05-03 RX ORDER — ROSUVASTATIN CALCIUM 20 MG/1
TABLET, COATED ORAL
Qty: 90 TABLET | Refills: 3 | Status: SHIPPED | OUTPATIENT
Start: 2019-05-03 | End: 2019-05-03 | Stop reason: SDUPTHER

## 2019-05-03 RX ORDER — ROSUVASTATIN CALCIUM 20 MG/1
20 TABLET, COATED ORAL DAILY
Qty: 30 TABLET | Refills: 3 | Status: SHIPPED | OUTPATIENT
Start: 2019-05-03 | End: 2020-11-10

## 2019-05-03 NOTE — TELEPHONE ENCOUNTER
Called and spoke to patient, let him know lab results. Patient agreed to taking the Crestor, said he tolerated it well. I sent a refill request to Fernando Goff for approval and he will see her Monday.

## 2019-05-06 ENCOUNTER — OFFICE VISIT (OUTPATIENT)
Dept: CARDIOLOGY | Age: 74
End: 2019-05-06
Payer: MEDICARE

## 2019-05-06 ENCOUNTER — TELEPHONE (OUTPATIENT)
Dept: CARDIOLOGY | Age: 74
End: 2019-05-06

## 2019-05-06 VITALS
SYSTOLIC BLOOD PRESSURE: 146 MMHG | WEIGHT: 180 LBS | HEIGHT: 73 IN | BODY MASS INDEX: 23.86 KG/M2 | DIASTOLIC BLOOD PRESSURE: 98 MMHG | HEART RATE: 78 BPM

## 2019-05-06 DIAGNOSIS — I25.10 CORONARY ARTERY DISEASE INVOLVING NATIVE CORONARY ARTERY OF NATIVE HEART WITHOUT ANGINA PECTORIS: ICD-10-CM

## 2019-05-06 DIAGNOSIS — I24.9 ACS (ACUTE CORONARY SYNDROME) (HCC): Primary | ICD-10-CM

## 2019-05-06 DIAGNOSIS — I10 ESSENTIAL HYPERTENSION: ICD-10-CM

## 2019-05-06 DIAGNOSIS — E78.5 DYSLIPIDEMIA: ICD-10-CM

## 2019-05-06 PROCEDURE — G8427 DOCREV CUR MEDS BY ELIG CLIN: HCPCS | Performed by: NURSE PRACTITIONER

## 2019-05-06 PROCEDURE — 4004F PT TOBACCO SCREEN RCVD TLK: CPT | Performed by: NURSE PRACTITIONER

## 2019-05-06 PROCEDURE — 1123F ACP DISCUSS/DSCN MKR DOCD: CPT | Performed by: NURSE PRACTITIONER

## 2019-05-06 PROCEDURE — 99213 OFFICE O/P EST LOW 20 MIN: CPT | Performed by: NURSE PRACTITIONER

## 2019-05-06 PROCEDURE — G8420 CALC BMI NORM PARAMETERS: HCPCS | Performed by: NURSE PRACTITIONER

## 2019-05-06 PROCEDURE — 3017F COLORECTAL CA SCREEN DOC REV: CPT | Performed by: NURSE PRACTITIONER

## 2019-05-06 PROCEDURE — G8598 ASA/ANTIPLAT THER USED: HCPCS | Performed by: NURSE PRACTITIONER

## 2019-05-06 PROCEDURE — 4040F PNEUMOC VAC/ADMIN/RCVD: CPT | Performed by: NURSE PRACTITIONER

## 2019-05-06 PROCEDURE — 93000 ELECTROCARDIOGRAM COMPLETE: CPT | Performed by: NURSE PRACTITIONER

## 2019-05-06 RX ORDER — NIFEDIPINE 30 MG/1
30 TABLET, EXTENDED RELEASE ORAL DAILY
Qty: 90 TABLET | Refills: 1 | Status: SHIPPED | OUTPATIENT
Start: 2019-05-06 | End: 2019-06-24

## 2019-05-06 NOTE — PROGRESS NOTES
History:   Diagnosis Date    CAD (coronary artery disease)     Hyperlipidemia     Dr. Erick Chu Hypertension     subendocardial    Myocardial infarction ([facility] [address]     Tobacco abuse        Past Surgical History:   Procedure Laterality Date    CARDIAC CATHETERIZATION      EF in excess of 50%    CARDIAC CATHETERIZATION  2016    Two bare metal stents to right coronary artery. Janki Levy M.D.   [address]  [address]       History reviewed. No pertinent family history. Social History     Socioeconomic History    Marital status:      Spouse name: Not on file    Number of children: Not on file    Years of education: Not on file    Highest education level: Not on file   Occupational History    Not on file   Social Needs    Financial resource strain: Not on file    Food insecurity:     Worry: Not on file     Inability: Not on file    Transportation needs:     Medical: Not on file     Non-medical: Not on file   Tobacco Use    Smoking status: Former Smoker     Last attempt to quit: 2012     Years since quittin.0    Smokeless tobacco: Current User     Types: Chew   Substance and Sexual Activity    Alcohol use: Yes     Alcohol/week: 0.0 oz     Comment: OCC.     Drug use: No    Sexual activity: Not on file   Lifestyle    Physical activity:     Days per week: Not on file     Minutes per session: Not on file    Stress: Not on file   Relationships    Social connections:     Talks on phone: Not on file     Gets together: Not on file     Attends [employer] service: Not on file     Active member of club or organization: Not on file     Attends meetings of clubs or organizations: Not on file     Relationship status: Not on file    Intimate partner violence:     Fear of current or ex partner: Not on file     Emotionally abused: Not on file     Physically abused: Not on file     Forced sexual activity: Not on file   Other Topics Concern    Not on file   Social History Narrative    Not on file       No Known Allergies      Current Outpatient Medications:     NIFEdipine (PROCARDIA XL) 30 MG extended release tablet, Take 1 tablet by mouth daily, Disp: 90 tablet, Rfl: 1    rosuvastatin (CRESTOR) 20 MG tablet, Take 1 tablet by mouth daily, Disp: 30 tablet, Rfl: 3    ramipril (ALTACE) 10 MG capsule, TAKE ONE CAPSULE BY MOUTH TWICE DAILY , Disp: 60 capsule, Rfl: 2    metoprolol succinate (TOPROL XL) 50 MG extended release tablet, TAKE ONE TABLET BY MOUTH ONE TIME DAILY , Disp: 90 tablet, Rfl: 3    nitroGLYCERIN (NITROSTAT) 0.4 MG SL tablet, Dissolve 1 tablet under tongue for chest pain and repeat every 5 min up to max of 3 total doses. If no relief after 3 doses call 911, Disp: 25 tablet, Rfl: 3    DiphenhydrAMINE HCl, Sleep, (EQ SLEEP AID) 50 MG CAPS, Take  by mouth.  , Disp: , Rfl:     aspirin 81 MG EC tablet, Take 81 mg by mouth daily. , Disp: , Rfl:     fish oil-omega-3 fatty acids 1000 MG capsule, Take 2 g by mouth daily. , Disp: , Rfl:     PE:  Vitals:    05/06/19 0944   BP: (!) 170/102   Pulse:        Estimated body mass index is 23.75 kg/m² as calculated from the following:    Height as of this encounter: 6' 1\" (1.854 m). Weight as of this encounter: 180 lb (81.6 kg). Constitutional: He is oriented to person, place, and time. He appears well-developed and well-nourished in no acute distress. Head: Normocephalic and atraumatic. Neck:  Neck supple without JVD present. Cardiovascular: Normal rate, regular rhythm, normal heart sounds. no murmur ascultated. No gallop and no friction rub.  no carotid bruits. no peripheral edema. Pulmonary/Chest:  Lungs clear to auscultation bilaterally without evidence of respiratory distress. He without wheezes. He without rales or ronchi. Musculoskeletal: Normal range of motion. Gait is normal no assitive device.   Neurological: He is alert and oriented to person, place, and time.   Skin: Skin is warm and dry without rash or pallor. Psychiatric: He has a normal mood and affect. His behavior is normal. Thought content normal.     Lab Results   Component Value Date    CREATININE 0.7 05/12/2017    CREATININE 0.7 05/11/2017    CREATININE 0.7 05/10/2017    HGB 12.0 05/12/2017    HGB 12.8 05/11/2017    HGB 13.3 05/10/2017       ECG 05/06/19  NSR 77 BPM, Rate variation        Cath 5/11/2017  Conclusions      1. Severe 1V CAD of the RCA with in stent restenosis   2. Successful PCI of the RCA with 3 RO      Recommendations      Routine post cath care   Optimize medical management for CAD   Aggressive risk factor modification      Signatures      ----------------------------------------------------------------   Electronically signed by Pricilla Spatz MD(Performing Physician)  Shanelle Hendrickson 05/24/2017 07:54      Assessment    1. ACS (acute coronary syndrome) ([facility] [address]    2. Dyslipidemia    3. Coronary artery disease involving native coronary artery of native heart without angina pectoris    4. Essential hypertension          Plan:    CAD- continue ASA, Statin, BB, AceI-, no change   HTN- uncontrolled, Discontinue Amlodipine and begin Nifedipine 30 mg daily, keep bp log. HLD- resume Crestor 20 mg daily as he tolerated this well. He states his never numbers of never been this high when he was on the Crestor. Recheck labs in 8-12 weeks. Disposition - RTC in 1 months with me at [place] for HTN  or sooner if needed    Please do not hesitate to contact me for any questions or concerns.     Sincerely yours,    SYMONE Gordon

## 2019-05-06 NOTE — TELEPHONE ENCOUNTER
Patient called wanting to speak with Shannan Vidal about his BP. Advised patient that she is seeing patients and asked if there was anything I could help with. Patient states that his BP was high today in office. He said he took it 30 minutes ago and it was 130/83. He said he wasn't sure what made it so high this morning. I told him there are many factors that could cause it. Advised him to please keep a record of BP and check two hours after taking BP meds and to call us back next Monday with those. He voiced understanding.

## 2019-06-07 ENCOUNTER — TELEPHONE (OUTPATIENT)
Dept: CARDIOLOGY | Age: 74
End: 2019-06-07

## 2019-06-15 DIAGNOSIS — I10 ESSENTIAL HYPERTENSION: ICD-10-CM

## 2019-06-17 RX ORDER — RAMIPRIL 10 MG/1
CAPSULE ORAL
Qty: 60 CAPSULE | Refills: 5 | Status: SHIPPED | OUTPATIENT
Start: 2019-06-17 | End: 2019-12-27 | Stop reason: SDUPTHER

## 2019-06-24 ENCOUNTER — OFFICE VISIT (OUTPATIENT)
Dept: CARDIOLOGY | Age: 74
End: 2019-06-24
Payer: MEDICARE

## 2019-06-24 VITALS
DIASTOLIC BLOOD PRESSURE: 90 MMHG | HEIGHT: 73 IN | HEART RATE: 60 BPM | WEIGHT: 180 LBS | BODY MASS INDEX: 23.86 KG/M2 | SYSTOLIC BLOOD PRESSURE: 132 MMHG

## 2019-06-24 DIAGNOSIS — I10 ESSENTIAL HYPERTENSION: ICD-10-CM

## 2019-06-24 DIAGNOSIS — E78.5 HYPERLIPIDEMIA, UNSPECIFIED HYPERLIPIDEMIA TYPE: Primary | ICD-10-CM

## 2019-06-24 DIAGNOSIS — I25.10 CORONARY ARTERY DISEASE INVOLVING NATIVE CORONARY ARTERY OF NATIVE HEART WITHOUT ANGINA PECTORIS: ICD-10-CM

## 2019-06-24 PROCEDURE — 1123F ACP DISCUSS/DSCN MKR DOCD: CPT | Performed by: NURSE PRACTITIONER

## 2019-06-24 PROCEDURE — 99213 OFFICE O/P EST LOW 20 MIN: CPT | Performed by: NURSE PRACTITIONER

## 2019-06-24 PROCEDURE — 3017F COLORECTAL CA SCREEN DOC REV: CPT | Performed by: NURSE PRACTITIONER

## 2019-06-24 PROCEDURE — 4004F PT TOBACCO SCREEN RCVD TLK: CPT | Performed by: NURSE PRACTITIONER

## 2019-06-24 PROCEDURE — G8598 ASA/ANTIPLAT THER USED: HCPCS | Performed by: NURSE PRACTITIONER

## 2019-06-24 PROCEDURE — G8420 CALC BMI NORM PARAMETERS: HCPCS | Performed by: NURSE PRACTITIONER

## 2019-06-24 PROCEDURE — 4040F PNEUMOC VAC/ADMIN/RCVD: CPT | Performed by: NURSE PRACTITIONER

## 2019-06-24 PROCEDURE — G8427 DOCREV CUR MEDS BY ELIG CLIN: HCPCS | Performed by: NURSE PRACTITIONER

## 2019-06-24 RX ORDER — NIFEDIPINE 30 MG/1
30 TABLET, EXTENDED RELEASE ORAL DAILY
Qty: 90 TABLET | Refills: 3 | Status: SHIPPED | OUTPATIENT
Start: 2019-06-24 | End: 2020-05-26

## 2019-06-24 RX ORDER — NIFEDIPINE 30 MG/1
30 TABLET, FILM COATED, EXTENDED RELEASE ORAL DAILY
COMMUNITY
End: 2019-06-24 | Stop reason: SDUPTHER

## 2019-06-24 RX ORDER — AMLODIPINE BESYLATE 5 MG/1
5 TABLET ORAL DAILY
COMMUNITY
End: 2019-06-24 | Stop reason: CLARIF

## 2019-06-24 RX ORDER — NITROGLYCERIN 0.4 MG/1
TABLET SUBLINGUAL
Qty: 25 TABLET | Refills: 3 | Status: SHIPPED | OUTPATIENT
Start: 2019-06-24 | End: 2022-10-17 | Stop reason: SDUPTHER

## 2019-06-24 NOTE — PROGRESS NOTES
artery disease)     Hyperlipidemia     Dr. Vikram Henley Hypertension     subendocardial    Myocardial infarction ([facility] [address]     Tobacco abuse        Past Surgical History:   Procedure Laterality Date    CARDIAC CATHETERIZATION      EF in excess of 50%    CARDIAC CATHETERIZATION  2016    Two bare metal stents to right coronary artery. Rudolph Monique M.D.   [address]  [address]       No family history on file. Social History     Socioeconomic History    Marital status:      Spouse name: Not on file    Number of children: Not on file    Years of education: Not on file    Highest education level: Not on file   Occupational History    Not on file   Social Needs    Financial resource strain: Not on file    Food insecurity:     Worry: Not on file     Inability: Not on file    Transportation needs:     Medical: Not on file     Non-medical: Not on file   Tobacco Use    Smoking status: Former Smoker     Last attempt to quit: 2012     Years since quittin.1    Smokeless tobacco: Current User     Types: Chew   Substance and Sexual Activity    Alcohol use: Yes     Alcohol/week: 0.0 oz     Comment: OCC.     Drug use: No    Sexual activity: Not on file   Lifestyle    Physical activity:     Days per week: Not on file     Minutes per session: Not on file    Stress: Not on file   Relationships    Social connections:     Talks on phone: Not on file     Gets together: Not on file     Attends [employer] service: Not on file     Active member of club or organization: Not on file     Attends meetings of clubs or organizations: Not on file     Relationship status: Not on file    Intimate partner violence:     Fear of current or ex partner: Not on file     Emotionally abused: Not on file     Physically abused: Not on file     Forced sexual activity: Not on file   Other Topics Concern    Not on file   Social History Narrative    Not on file motion. Gait is normal.  Head is normocephalic and atraumatic. Skin: Skin is warm and dry without rash or pallor. Psychiatric:He is alert and oriented to person, place, and time. He has a normal mood and affect. His behavior is normal. Thought content normal.     Lab Results   Component Value Date    CREATININE 0.7 05/12/2017    CREATININE 0.7 05/11/2017    CREATININE 0.7 05/10/2017    HGB 12.0 05/12/2017    HGB 12.8 05/11/2017    HGB 13.3 05/10/2017          Assessment, Recommendations, & Plan:  68 y.o. male with CAD, HTN, & HLD    CAD-on ASA, BB, ACE, & statin. No changes    HTN-improved today. Home readings have ranged from 027-170 systolic and 31-08 diastolic. No changes    HLD-he has been back on Crestor for approximately 7 weeks. He will get fasting blood work done in the next couple weeks. We will call him with results      Disposition - RTC in 6 months with Dr. Divya Arthur or sooner if needed      Please do not hesitate to contact me for any questions or concerns.     Sincerely yours,    SYMONE Kinney

## 2019-06-24 NOTE — PATIENT INSTRUCTIONS
Please get fasting lab work in the next couple of weeks. Hypertension  (High Blood Pressure)  Most people with high blood pressure (hypertension) have no symptoms. High blood pressure can be a dangerous problem. Hypertension is dangerous because you may have it and not know it. High blood pressure may mean that your heart needs to work harder to pump blood. Your blood pressure is measured with 2 numbers. The first number is when your heart flexes (contracts), and the second number is when your heart relaxes. The higher the numbers are, the more you are at risk for problems. Write down your blood pressure today. The best blood pressure for adults is 120/80 (mmHg) or lower. It is likely that your blood pressure was recorded at least 2 times today. It is important for you to give these numbers to your doctor. If you do not have a doctor, try to get follow-up care at a hospital or community clinic. You may need to start high blood pressure medicine. You may also need to adjust your medicines as told by your doctor. Even mild high blood pressure increases long-term health risks. One high reading does not mean you have hypertension. · Your blood pressure should be taken when you are relaxed. It is also important to sit for about 10 minutes before being tested. · Things that can increase your blood pressure are:  Injury, Illness, Stress, Caffeine, and some medicines (like decongestants). · High blood pressure does not usually need emergency treatment. HOME CARE  · Lifestyle and medicine changes may be needed, including:   · Weight loss. · Exercise. · Limit the use of salt. · Stop smoking. · If using decongestants or birth control pills, talk to your doctor. These medicines might make blood pressure higher. · Do not use street drugs. · Females should not drink more than 1 alcoholic drink per day. Males should not drink more than 2 alcoholic drinks per day.    · Take your blood pressure

## 2019-07-08 DIAGNOSIS — E78.5 HYPERLIPIDEMIA, UNSPECIFIED HYPERLIPIDEMIA TYPE: ICD-10-CM

## 2019-07-08 DIAGNOSIS — E78.5 DYSLIPIDEMIA: ICD-10-CM

## 2019-07-08 LAB
ALBUMIN SERPL-MCNC: 4.5 G/DL (ref 3.5–5.2)
ALP BLD-CCNC: 74 U/L (ref 40–130)
ALT SERPL-CCNC: 19 U/L (ref 5–41)
AST SERPL-CCNC: 25 U/L (ref 5–40)
BILIRUB SERPL-MCNC: <0.2 MG/DL (ref 0.2–1.2)
BILIRUBIN DIRECT: 0.1 MG/DL (ref 0–0.3)
BILIRUBIN, INDIRECT: 0.1 MG/DL (ref 0.1–1)
CHOLESTEROL, TOTAL: 151 MG/DL (ref 160–199)
HDLC SERPL-MCNC: 60 MG/DL (ref 55–121)
LDL CHOLESTEROL CALCULATED: 77 MG/DL
TOTAL PROTEIN: 7.5 G/DL (ref 6.6–8.7)
TRIGL SERPL-MCNC: 72 MG/DL (ref 0–149)

## 2019-07-15 DIAGNOSIS — I10 ESSENTIAL HYPERTENSION: ICD-10-CM

## 2019-07-15 RX ORDER — METOPROLOL SUCCINATE 50 MG/1
TABLET, EXTENDED RELEASE ORAL
Qty: 90 TABLET | Refills: 2 | Status: SHIPPED | OUTPATIENT
Start: 2019-07-15 | End: 2020-03-17

## 2019-10-29 ENCOUNTER — TELEPHONE (OUTPATIENT)
Dept: CARDIOLOGY | Age: 74
End: 2019-10-29

## 2019-12-27 DIAGNOSIS — I10 ESSENTIAL HYPERTENSION: ICD-10-CM

## 2019-12-27 RX ORDER — RAMIPRIL 10 MG/1
CAPSULE ORAL
Qty: 60 CAPSULE | Refills: 5 | Status: SHIPPED | OUTPATIENT
Start: 2019-12-27 | End: 2020-05-26

## 2020-02-26 ENCOUNTER — OFFICE VISIT (OUTPATIENT)
Dept: CARDIOLOGY | Age: 75
End: 2020-02-26
Payer: MEDICARE

## 2020-02-26 VITALS
BODY MASS INDEX: 23.16 KG/M2 | SYSTOLIC BLOOD PRESSURE: 98 MMHG | HEIGHT: 72 IN | DIASTOLIC BLOOD PRESSURE: 72 MMHG | HEART RATE: 77 BPM | WEIGHT: 171 LBS

## 2020-02-26 PROCEDURE — G8420 CALC BMI NORM PARAMETERS: HCPCS | Performed by: INTERNAL MEDICINE

## 2020-02-26 PROCEDURE — 93000 ELECTROCARDIOGRAM COMPLETE: CPT | Performed by: INTERNAL MEDICINE

## 2020-02-26 PROCEDURE — G8484 FLU IMMUNIZE NO ADMIN: HCPCS | Performed by: INTERNAL MEDICINE

## 2020-02-26 PROCEDURE — 4004F PT TOBACCO SCREEN RCVD TLK: CPT | Performed by: INTERNAL MEDICINE

## 2020-02-26 PROCEDURE — 1123F ACP DISCUSS/DSCN MKR DOCD: CPT | Performed by: INTERNAL MEDICINE

## 2020-02-26 PROCEDURE — 3017F COLORECTAL CA SCREEN DOC REV: CPT | Performed by: INTERNAL MEDICINE

## 2020-02-26 PROCEDURE — G8427 DOCREV CUR MEDS BY ELIG CLIN: HCPCS | Performed by: INTERNAL MEDICINE

## 2020-02-26 PROCEDURE — 99213 OFFICE O/P EST LOW 20 MIN: CPT | Performed by: INTERNAL MEDICINE

## 2020-02-26 PROCEDURE — 4040F PNEUMOC VAC/ADMIN/RCVD: CPT | Performed by: INTERNAL MEDICINE

## 2020-02-26 NOTE — PROGRESS NOTES
70-year-old gentleman with a history of prior tobacco abuse, dyslipidemia, hypertension, and coronary disease returns for follow-up. There is a history of a nontransmural myocardial infarction in December 2016 at which time he had 2 bare-metal stents placed in his right coronary artery. In May 2017 he developed recurrent left arm discomfort and accompanied dyspnea prompting angiographic restudy revealing a 50% proximal LAD and 99% in-stent restenosis. He underwent placement of 2 additional stents in his right coronary artery and is remained asymptomatic since that time. He has demonstrated previous severe dyslipidemia with an LDL of 202 in March 2019, apparently consequent to having run out of his statin therapy. Reinstituting that therapy in the form of Crestor 20 mg a day is more recent LDL was recorded at 77. The rest of his review of systems reveals some mild chronic dyspnea on exertion with no recent change. On exam he carries 171 pounds on a 6 foot frame. Pressure is 98/72 with pulse of 77. Normal male balding pattern. EOMs full, sclerae and conjunctiva normal. Normal dentition. No elevation of central venous pressure at 45 degrees. Carotid upstrokes normal without delay or bruit. Thyroid normal to palpation. Decreased breath sounds with mild prolongation of expiratory phase. . No skin lesions seen. PMI normal. S1, S2 normal without murmur or micheal. Normal bowel sounds without palpable mass or bruit. No significant lower extremity edema. Normal range of motion with normal gait. Alert, oriented x 3 and cognition normal as reflected by conversation. EKG reveals sinus mechanism without abnormalities. Assessment/plan:  1. Coronary disease-approaching 3 years out from address of a restenotic lesion and from demonstration of a 50% LAD stenosis. Will obtain a dobutamine stress echo. 2.  Dyslipidemia -obviously  severe dyslipidemia responsive to high-dose Crestor therapy.   3.  Hypertension -pressure well controlled with no orthostatic symptoms despite low value today. 4.  Tobacco abuse - inactive    Medical records reviewed prior to today's clinic visit. More than 15 minutes spent face-to-face with patient in evaluating, and carefully explaining their problems and the planned approach.

## 2020-02-26 NOTE — PATIENT INSTRUCTIONS
[place] at the [facility] and [address] located on the first floor of [place] [address] through hospital main entrance and turn immediately to your left. Patient contact number:  [phone] (home)      Date/Arrival Time:      Stress Echocardiogram      This records the heart's activity during a cardiac stress test.  A stress echocardiogram is a very effective, noninvasive test that can help determine whether you have blockages in your coronary arteries. The exam takes approximately thirty minutes. To help ensure accurate results, patients should take the following steps in preparation for a stress echocardiogram:   Refrain from strenuous activity for 12 hours before the test.   Do not eat, drink, or smoke for two hours prior to the test.  Unless instructed otherwise by your physician, you should continue to take prescribed medications. Wear loose, comfortable clothing and walking shoes. If you need to change this appointment, please call [phone] to reschedule.

## 2020-03-12 ENCOUNTER — TELEPHONE (OUTPATIENT)
Dept: CARDIOLOGY | Age: 75
End: 2020-03-12

## 2020-03-16 ENCOUNTER — TELEPHONE (OUTPATIENT)
Dept: CARDIOLOGY | Age: 75
End: 2020-03-16

## 2020-03-17 RX ORDER — METOPROLOL SUCCINATE 50 MG/1
TABLET, EXTENDED RELEASE ORAL
Qty: 90 TABLET | Refills: 2 | Status: SHIPPED | OUTPATIENT
Start: 2020-03-17 | End: 2020-11-10

## 2020-05-26 RX ORDER — NIFEDIPINE 30 MG/1
TABLET, FILM COATED, EXTENDED RELEASE ORAL
Qty: 90 TABLET | Refills: 3 | Status: SHIPPED | OUTPATIENT
Start: 2020-05-26 | End: 2020-10-08 | Stop reason: ALTCHOICE

## 2020-05-26 RX ORDER — RAMIPRIL 10 MG/1
CAPSULE ORAL
Qty: 60 CAPSULE | Refills: 5 | Status: SHIPPED | OUTPATIENT
Start: 2020-05-26 | End: 2020-11-10 | Stop reason: DRUGHIGH

## 2020-09-05 ENCOUNTER — HOSPITAL ENCOUNTER (INPATIENT)
Age: 75
LOS: 6 days | Discharge: NURSING, REHAB OR LONG-TERM CARE | DRG: 470 | End: 2020-09-11
Attending: INTERNAL MEDICINE | Admitting: HOSPITALIST
Payer: MEDICARE

## 2020-09-05 PROBLEM — S72.141A CLOSED COMMINUTED INTERTROCHANTERIC FRACTURE OF PROXIMAL END OF RIGHT FEMUR (HCC): Status: ACTIVE | Noted: 2020-09-05

## 2020-09-05 PROBLEM — F10.20 ALCOHOL DEPENDENCE, CONTINUOUS (HCC): Status: ACTIVE | Noted: 2020-09-05

## 2020-09-05 PROBLEM — F10.932 ALCOHOL WITHDRAWAL HALLUCINOSIS (HCC): Status: ACTIVE | Noted: 2020-09-05

## 2020-09-05 LAB
ABO/RH: NORMAL
ALBUMIN SERPL-MCNC: 4.1 G/DL (ref 3.5–5.2)
ALP BLD-CCNC: 101 U/L (ref 40–130)
ALT SERPL-CCNC: 114 U/L (ref 5–41)
ANION GAP SERPL CALCULATED.3IONS-SCNC: 22 MMOL/L (ref 7–19)
ANTIBODY SCREEN: NORMAL
APTT: 27.6 SEC (ref 26–36.2)
AST SERPL-CCNC: 225 U/L (ref 5–40)
BASOPHILS ABSOLUTE: 0 K/UL (ref 0–0.2)
BASOPHILS RELATIVE PERCENT: 0.6 % (ref 0–1)
BILIRUB SERPL-MCNC: 0.7 MG/DL (ref 0.2–1.2)
BUN BLDV-MCNC: 9 MG/DL (ref 8–23)
CALCIUM SERPL-MCNC: 9.7 MG/DL (ref 8.8–10.2)
CHLORIDE BLD-SCNC: 94 MMOL/L (ref 98–111)
CO2: 19 MMOL/L (ref 22–29)
CREAT SERPL-MCNC: 0.6 MG/DL (ref 0.5–1.2)
EOSINOPHILS ABSOLUTE: 0 K/UL (ref 0–0.6)
EOSINOPHILS RELATIVE PERCENT: 0.2 % (ref 0–5)
GFR AFRICAN AMERICAN: >59
GFR NON-AFRICAN AMERICAN: >60
GLUCOSE BLD-MCNC: 66 MG/DL (ref 74–109)
HCT VFR BLD CALC: 36.5 % (ref 42–52)
HEMOGLOBIN: 12.4 G/DL (ref 14–18)
IMMATURE GRANULOCYTES #: 0 K/UL
INR BLD: 1.05 (ref 0.88–1.18)
LYMPHOCYTES ABSOLUTE: 0.6 K/UL (ref 1.1–4.5)
LYMPHOCYTES RELATIVE PERCENT: 11.1 % (ref 20–40)
MCH RBC QN AUTO: 33.6 PG (ref 27–31)
MCHC RBC AUTO-ENTMCNC: 34 G/DL (ref 33–37)
MCV RBC AUTO: 98.9 FL (ref 80–94)
MONOCYTES ABSOLUTE: 0.8 K/UL (ref 0–0.9)
MONOCYTES RELATIVE PERCENT: 14.3 % (ref 0–10)
NEUTROPHILS ABSOLUTE: 4 K/UL (ref 1.5–7.5)
NEUTROPHILS RELATIVE PERCENT: 73.4 % (ref 50–65)
PDW BLD-RTO: 12.5 % (ref 11.5–14.5)
PLATELET # BLD: 121 K/UL (ref 130–400)
PMV BLD AUTO: 9 FL (ref 9.4–12.4)
POTASSIUM REFLEX MAGNESIUM: 4.1 MMOL/L (ref 3.5–5)
PROTHROMBIN TIME: 13.7 SEC (ref 12–14.6)
RBC # BLD: 3.69 M/UL (ref 4.7–6.1)
SODIUM BLD-SCNC: 135 MMOL/L (ref 136–145)
TOTAL PROTEIN: 7 G/DL (ref 6.6–8.7)
WBC # BLD: 5.4 K/UL (ref 4.8–10.8)

## 2020-09-05 PROCEDURE — 85025 COMPLETE CBC W/AUTO DIFF WBC: CPT

## 2020-09-05 PROCEDURE — 2580000003 HC RX 258: Performed by: HOSPITALIST

## 2020-09-05 PROCEDURE — 6360000002 HC RX W HCPCS: Performed by: HOSPITALIST

## 2020-09-05 PROCEDURE — 85730 THROMBOPLASTIN TIME PARTIAL: CPT

## 2020-09-05 PROCEDURE — 85610 PROTHROMBIN TIME: CPT

## 2020-09-05 PROCEDURE — 6360000002 HC RX W HCPCS

## 2020-09-05 PROCEDURE — 80053 COMPREHEN METABOLIC PANEL: CPT

## 2020-09-05 PROCEDURE — 86900 BLOOD TYPING SEROLOGIC ABO: CPT

## 2020-09-05 PROCEDURE — 36415 COLL VENOUS BLD VENIPUNCTURE: CPT

## 2020-09-05 PROCEDURE — 86850 RBC ANTIBODY SCREEN: CPT

## 2020-09-05 PROCEDURE — 86901 BLOOD TYPING SEROLOGIC RH(D): CPT

## 2020-09-05 PROCEDURE — 1210000000 HC MED SURG R&B

## 2020-09-05 PROCEDURE — 6370000000 HC RX 637 (ALT 250 FOR IP): Performed by: HOSPITALIST

## 2020-09-05 RX ORDER — RAMIPRIL 10 MG/1
10 CAPSULE ORAL DAILY
Status: DISCONTINUED | OUTPATIENT
Start: 2020-09-06 | End: 2020-09-11 | Stop reason: HOSPADM

## 2020-09-05 RX ORDER — THIAMINE HYDROCHLORIDE 100 MG/ML
100 INJECTION, SOLUTION INTRAMUSCULAR; INTRAVENOUS DAILY
Status: DISCONTINUED | OUTPATIENT
Start: 2020-09-06 | End: 2020-09-07

## 2020-09-05 RX ORDER — LORAZEPAM 1 MG/1
2 TABLET ORAL ONCE
Status: DISCONTINUED | OUTPATIENT
Start: 2020-09-08 | End: 2020-09-07

## 2020-09-05 RX ORDER — ACETAMINOPHEN 650 MG/1
650 SUPPOSITORY RECTAL EVERY 6 HOURS PRN
Status: DISCONTINUED | OUTPATIENT
Start: 2020-09-05 | End: 2020-09-05

## 2020-09-05 RX ORDER — ROSUVASTATIN CALCIUM 20 MG/1
20 TABLET, COATED ORAL DAILY
Status: DISCONTINUED | OUTPATIENT
Start: 2020-09-06 | End: 2020-09-11 | Stop reason: HOSPADM

## 2020-09-05 RX ORDER — ARIPIPRAZOLE 2 MG/1
2 TABLET ORAL DAILY
COMMUNITY

## 2020-09-05 RX ORDER — MULTIVITAMIN WITH IRON
1 TABLET ORAL DAILY
Status: DISCONTINUED | OUTPATIENT
Start: 2020-09-06 | End: 2020-09-07

## 2020-09-05 RX ORDER — NIFEDIPINE 30 MG/1
30 TABLET, EXTENDED RELEASE ORAL DAILY
Status: DISCONTINUED | OUTPATIENT
Start: 2020-09-06 | End: 2020-09-11 | Stop reason: HOSPADM

## 2020-09-05 RX ORDER — METOPROLOL SUCCINATE 50 MG/1
50 TABLET, EXTENDED RELEASE ORAL DAILY
Status: DISCONTINUED | OUTPATIENT
Start: 2020-09-06 | End: 2020-09-11 | Stop reason: HOSPADM

## 2020-09-05 RX ORDER — LORAZEPAM 2 MG/ML
4 INJECTION INTRAMUSCULAR
Status: DISCONTINUED | OUTPATIENT
Start: 2020-09-05 | End: 2020-09-07

## 2020-09-05 RX ORDER — SODIUM CHLORIDE 0.9 % (FLUSH) 0.9 %
10 SYRINGE (ML) INJECTION PRN
Status: DISCONTINUED | OUTPATIENT
Start: 2020-09-05 | End: 2020-09-06

## 2020-09-05 RX ORDER — LORAZEPAM 2 MG/ML
3 INJECTION INTRAMUSCULAR
Status: DISCONTINUED | OUTPATIENT
Start: 2020-09-05 | End: 2020-09-11 | Stop reason: HOSPADM

## 2020-09-05 RX ORDER — OMEGA-3/DHA/EPA/FISH OIL 300-1000MG
1000 CAPSULE ORAL DAILY
Status: DISCONTINUED | OUTPATIENT
Start: 2020-09-05 | End: 2020-09-05

## 2020-09-05 RX ORDER — LORAZEPAM 1 MG/1
2 TABLET ORAL ONCE
Status: COMPLETED | OUTPATIENT
Start: 2020-09-05 | End: 2020-09-05

## 2020-09-05 RX ORDER — ACETAMINOPHEN 325 MG/1
650 TABLET ORAL EVERY 6 HOURS PRN
Status: DISCONTINUED | OUTPATIENT
Start: 2020-09-05 | End: 2020-09-05

## 2020-09-05 RX ORDER — ARIPIPRAZOLE 2 MG/1
2 TABLET ORAL DAILY
Status: DISCONTINUED | OUTPATIENT
Start: 2020-09-06 | End: 2020-09-11 | Stop reason: HOSPADM

## 2020-09-05 RX ORDER — MORPHINE SULFATE 4 MG/ML
1 INJECTION, SOLUTION INTRAMUSCULAR; INTRAVENOUS EVERY 4 HOURS PRN
Status: DISCONTINUED | OUTPATIENT
Start: 2020-09-05 | End: 2020-09-05

## 2020-09-05 RX ORDER — LORAZEPAM 1 MG/1
2 TABLET ORAL 3 TIMES DAILY
Status: DISPENSED | OUTPATIENT
Start: 2020-09-06 | End: 2020-09-07

## 2020-09-05 RX ORDER — LORAZEPAM 1 MG/1
2 TABLET ORAL
Status: DISCONTINUED | OUTPATIENT
Start: 2020-09-05 | End: 2020-09-11 | Stop reason: HOSPADM

## 2020-09-05 RX ORDER — SODIUM CHLORIDE 0.9 % (FLUSH) 0.9 %
10 SYRINGE (ML) INJECTION EVERY 12 HOURS SCHEDULED
Status: DISCONTINUED | OUTPATIENT
Start: 2020-09-05 | End: 2020-09-06

## 2020-09-05 RX ORDER — NALOXONE HYDROCHLORIDE 0.4 MG/ML
0.4 INJECTION, SOLUTION INTRAMUSCULAR; INTRAVENOUS; SUBCUTANEOUS PRN
Status: DISCONTINUED | OUTPATIENT
Start: 2020-09-05 | End: 2020-09-11 | Stop reason: HOSPADM

## 2020-09-05 RX ORDER — MORPHINE SULFATE 4 MG/ML
2 INJECTION, SOLUTION INTRAMUSCULAR; INTRAVENOUS EVERY 4 HOURS PRN
Status: DISCONTINUED | OUTPATIENT
Start: 2020-09-05 | End: 2020-09-05

## 2020-09-05 RX ORDER — LORAZEPAM 1 MG/1
3 TABLET ORAL
Status: DISCONTINUED | OUTPATIENT
Start: 2020-09-05 | End: 2020-09-11 | Stop reason: HOSPADM

## 2020-09-05 RX ORDER — MORPHINE SULFATE 4 MG/ML
4 INJECTION, SOLUTION INTRAMUSCULAR; INTRAVENOUS EVERY 4 HOURS PRN
Status: DISCONTINUED | OUTPATIENT
Start: 2020-09-05 | End: 2020-09-05

## 2020-09-05 RX ORDER — HYDROMORPHONE HYDROCHLORIDE 1 MG/ML
2 INJECTION, SOLUTION INTRAMUSCULAR; INTRAVENOUS; SUBCUTANEOUS EVERY 4 HOURS PRN
Status: DISCONTINUED | OUTPATIENT
Start: 2020-09-05 | End: 2020-09-08

## 2020-09-05 RX ORDER — LORAZEPAM 2 MG/ML
1 INJECTION INTRAMUSCULAR
Status: DISCONTINUED | OUTPATIENT
Start: 2020-09-05 | End: 2020-09-11 | Stop reason: HOSPADM

## 2020-09-05 RX ORDER — ESCITALOPRAM OXALATE 10 MG/1
20 TABLET ORAL DAILY
Status: DISCONTINUED | OUTPATIENT
Start: 2020-09-06 | End: 2020-09-11 | Stop reason: HOSPADM

## 2020-09-05 RX ORDER — HYDROMORPHONE HYDROCHLORIDE 1 MG/ML
1 INJECTION, SOLUTION INTRAMUSCULAR; INTRAVENOUS; SUBCUTANEOUS EVERY 4 HOURS PRN
Status: DISCONTINUED | OUTPATIENT
Start: 2020-09-05 | End: 2020-09-08

## 2020-09-05 RX ORDER — HYDROMORPHONE HYDROCHLORIDE 1 MG/ML
1 INJECTION, SOLUTION INTRAMUSCULAR; INTRAVENOUS; SUBCUTANEOUS EVERY 4 HOURS PRN
Status: DISCONTINUED | OUTPATIENT
Start: 2020-09-05 | End: 2020-09-05

## 2020-09-05 RX ORDER — NITROGLYCERIN 0.4 MG/1
0.4 TABLET SUBLINGUAL EVERY 5 MIN PRN
Status: DISCONTINUED | OUTPATIENT
Start: 2020-09-05 | End: 2020-09-11 | Stop reason: HOSPADM

## 2020-09-05 RX ORDER — DIPHENHYDRAMINE HCL 25 MG
50 TABLET ORAL NIGHTLY
Status: DISCONTINUED | OUTPATIENT
Start: 2020-09-05 | End: 2020-09-10

## 2020-09-05 RX ORDER — LORAZEPAM 2 MG/ML
2 INJECTION INTRAMUSCULAR
Status: DISCONTINUED | OUTPATIENT
Start: 2020-09-05 | End: 2020-09-11 | Stop reason: HOSPADM

## 2020-09-05 RX ORDER — MORPHINE SULFATE 4 MG/ML
INJECTION, SOLUTION INTRAMUSCULAR; INTRAVENOUS
Status: COMPLETED
Start: 2020-09-05 | End: 2020-09-05

## 2020-09-05 RX ORDER — LORAZEPAM 1 MG/1
4 TABLET ORAL
Status: DISCONTINUED | OUTPATIENT
Start: 2020-09-05 | End: 2020-09-07

## 2020-09-05 RX ORDER — LORAZEPAM 1 MG/1
2 TABLET ORAL 2 TIMES DAILY
Status: DISCONTINUED | OUTPATIENT
Start: 2020-09-07 | End: 2020-09-07

## 2020-09-05 RX ORDER — LORAZEPAM 1 MG/1
1 TABLET ORAL
Status: DISCONTINUED | OUTPATIENT
Start: 2020-09-05 | End: 2020-09-11 | Stop reason: HOSPADM

## 2020-09-05 RX ORDER — ACETAMINOPHEN 325 MG/1
650 TABLET ORAL EVERY 6 HOURS PRN
Status: DISCONTINUED | OUTPATIENT
Start: 2020-09-05 | End: 2020-09-11 | Stop reason: HOSPADM

## 2020-09-05 RX ORDER — ACETAMINOPHEN 650 MG/1
650 SUPPOSITORY RECTAL EVERY 6 HOURS PRN
Status: DISCONTINUED | OUTPATIENT
Start: 2020-09-05 | End: 2020-09-11 | Stop reason: HOSPADM

## 2020-09-05 RX ORDER — ESCITALOPRAM OXALATE 20 MG/1
20 TABLET ORAL DAILY
COMMUNITY

## 2020-09-05 RX ORDER — THIAMINE MONONITRATE (VIT B1) 100 MG
100 TABLET ORAL DAILY
Status: DISCONTINUED | OUTPATIENT
Start: 2020-09-06 | End: 2020-09-07

## 2020-09-05 RX ORDER — HYDROMORPHONE HYDROCHLORIDE 1 MG/ML
0.5 INJECTION, SOLUTION INTRAMUSCULAR; INTRAVENOUS; SUBCUTANEOUS EVERY 4 HOURS PRN
Status: DISCONTINUED | OUTPATIENT
Start: 2020-09-05 | End: 2020-09-11 | Stop reason: HOSPADM

## 2020-09-05 RX ORDER — ASPIRIN 81 MG/1
81 TABLET ORAL DAILY
Status: DISCONTINUED | OUTPATIENT
Start: 2020-09-06 | End: 2020-09-05

## 2020-09-05 RX ADMIN — HYDROMORPHONE HYDROCHLORIDE 1 MG: 1 INJECTION, SOLUTION INTRAMUSCULAR; INTRAVENOUS; SUBCUTANEOUS at 21:57

## 2020-09-05 RX ADMIN — DIPHENHYDRAMINE HCL 50 MG: 25 TABLET ORAL at 21:57

## 2020-09-05 RX ADMIN — Medication: at 20:44

## 2020-09-05 RX ADMIN — MORPHINE SULFATE 4 MG: 4 INJECTION, SOLUTION INTRAMUSCULAR; INTRAVENOUS at 20:44

## 2020-09-05 RX ADMIN — SODIUM CHLORIDE, PRESERVATIVE FREE 10 ML: 5 INJECTION INTRAVENOUS at 21:57

## 2020-09-05 RX ADMIN — LORAZEPAM 2 MG: 1 TABLET ORAL at 21:56

## 2020-09-05 ASSESSMENT — ENCOUNTER SYMPTOMS
ABDOMINAL PAIN: 0
NAUSEA: 0
DIARRHEA: 0
CONSTIPATION: 0
SHORTNESS OF BREATH: 0

## 2020-09-05 ASSESSMENT — PAIN SCALES - GENERAL
PAINLEVEL_OUTOF10: 10

## 2020-09-05 NOTE — LETTER
Shonna Devries,  at [facility]  [phone] phone  [phone] fax  [phone] CELL ([address])    Shonna Devries  at [facility]  [phone] phone  [phone] fax  [phone] CELL ([address])

## 2020-09-06 ENCOUNTER — ANESTHESIA EVENT (OUTPATIENT)
Dept: OPERATING ROOM | Age: 75
DRG: 470 | End: 2020-09-06
Payer: MEDICARE

## 2020-09-06 ENCOUNTER — APPOINTMENT (OUTPATIENT)
Dept: GENERAL RADIOLOGY | Age: 75
DRG: 470 | End: 2020-09-06
Attending: INTERNAL MEDICINE
Payer: MEDICARE

## 2020-09-06 ENCOUNTER — ANESTHESIA (OUTPATIENT)
Dept: OPERATING ROOM | Age: 75
DRG: 470 | End: 2020-09-06
Payer: MEDICARE

## 2020-09-06 VITALS
OXYGEN SATURATION: 97 % | RESPIRATION RATE: 3 BRPM | DIASTOLIC BLOOD PRESSURE: 77 MMHG | TEMPERATURE: 99 F | SYSTOLIC BLOOD PRESSURE: 134 MMHG

## 2020-09-06 LAB
ANION GAP SERPL CALCULATED.3IONS-SCNC: 24 MMOL/L (ref 7–19)
BASOPHILS ABSOLUTE: 0 K/UL (ref 0–0.2)
BASOPHILS RELATIVE PERCENT: 0.7 % (ref 0–1)
BUN BLDV-MCNC: 11 MG/DL (ref 8–23)
CALCIUM SERPL-MCNC: 9.5 MG/DL (ref 8.8–10.2)
CHLORIDE BLD-SCNC: 90 MMOL/L (ref 98–111)
CO2: 19 MMOL/L (ref 22–29)
CREAT SERPL-MCNC: 0.7 MG/DL (ref 0.5–1.2)
EOSINOPHILS ABSOLUTE: 0 K/UL (ref 0–0.6)
EOSINOPHILS RELATIVE PERCENT: 0.5 % (ref 0–5)
GFR AFRICAN AMERICAN: >59
GFR NON-AFRICAN AMERICAN: >60
GLUCOSE BLD-MCNC: 80 MG/DL (ref 74–109)
HCT VFR BLD CALC: 38.5 % (ref 42–52)
HEMOGLOBIN: 12.5 G/DL (ref 14–18)
IMMATURE GRANULOCYTES #: 0 K/UL
LYMPHOCYTES ABSOLUTE: 0.7 K/UL (ref 1.1–4.5)
LYMPHOCYTES RELATIVE PERCENT: 13.1 % (ref 20–40)
MCH RBC QN AUTO: 33.5 PG (ref 27–31)
MCHC RBC AUTO-ENTMCNC: 32.5 G/DL (ref 33–37)
MCV RBC AUTO: 103.2 FL (ref 80–94)
MONOCYTES ABSOLUTE: 1 K/UL (ref 0–0.9)
MONOCYTES RELATIVE PERCENT: 17.3 % (ref 0–10)
NEUTROPHILS ABSOLUTE: 3.8 K/UL (ref 1.5–7.5)
NEUTROPHILS RELATIVE PERCENT: 68.2 % (ref 50–65)
PDW BLD-RTO: 12.6 % (ref 11.5–14.5)
PLATELET # BLD: 110 K/UL (ref 130–400)
PMV BLD AUTO: 9.4 FL (ref 9.4–12.4)
POTASSIUM REFLEX MAGNESIUM: 4.2 MMOL/L (ref 3.5–5)
RBC # BLD: 3.73 M/UL (ref 4.7–6.1)
SARS-COV-2, NAAT: NOT DETECTED
SODIUM BLD-SCNC: 133 MMOL/L (ref 136–145)
WBC # BLD: 5.6 K/UL (ref 4.8–10.8)

## 2020-09-06 PROCEDURE — 6360000002 HC RX W HCPCS: Performed by: ORTHOPAEDIC SURGERY

## 2020-09-06 PROCEDURE — 80048 BASIC METABOLIC PNL TOTAL CA: CPT

## 2020-09-06 PROCEDURE — 7100000000 HC PACU RECOVERY - FIRST 15 MIN: Performed by: ORTHOPAEDIC SURGERY

## 2020-09-06 PROCEDURE — 2580000003 HC RX 258: Performed by: HOSPITALIST

## 2020-09-06 PROCEDURE — 3600000005 HC SURGERY LEVEL 5 BASE: Performed by: ORTHOPAEDIC SURGERY

## 2020-09-06 PROCEDURE — 3600000015 HC SURGERY LEVEL 5 ADDTL 15MIN: Performed by: ORTHOPAEDIC SURGERY

## 2020-09-06 PROCEDURE — 6370000000 HC RX 637 (ALT 250 FOR IP): Performed by: ORTHOPAEDIC SURGERY

## 2020-09-06 PROCEDURE — 6360000002 HC RX W HCPCS: Performed by: NURSE ANESTHETIST, CERTIFIED REGISTERED

## 2020-09-06 PROCEDURE — 3700000000 HC ANESTHESIA ATTENDED CARE: Performed by: ORTHOPAEDIC SURGERY

## 2020-09-06 PROCEDURE — 3700000001 HC ADD 15 MINUTES (ANESTHESIA): Performed by: ORTHOPAEDIC SURGERY

## 2020-09-06 PROCEDURE — 7100000001 HC PACU RECOVERY - ADDTL 15 MIN: Performed by: ORTHOPAEDIC SURGERY

## 2020-09-06 PROCEDURE — 0SRR0JA REPLACEMENT OF RIGHT HIP JOINT, FEMORAL SURFACE WITH SYNTHETIC SUBSTITUTE, UNCEMENTED, OPEN APPROACH: ICD-10-PCS | Performed by: ORTHOPAEDIC SURGERY

## 2020-09-06 PROCEDURE — 2580000003 HC RX 258: Performed by: ORTHOPAEDIC SURGERY

## 2020-09-06 PROCEDURE — C1776 JOINT DEVICE (IMPLANTABLE): HCPCS | Performed by: ORTHOPAEDIC SURGERY

## 2020-09-06 PROCEDURE — 2709999900 HC NON-CHARGEABLE SUPPLY: Performed by: ORTHOPAEDIC SURGERY

## 2020-09-06 PROCEDURE — 2500000003 HC RX 250 WO HCPCS: Performed by: HOSPITALIST

## 2020-09-06 PROCEDURE — 6370000000 HC RX 637 (ALT 250 FOR IP): Performed by: HOSPITALIST

## 2020-09-06 PROCEDURE — U0002 COVID-19 LAB TEST NON-CDC: HCPCS

## 2020-09-06 PROCEDURE — 85025 COMPLETE CBC W/AUTO DIFF WBC: CPT

## 2020-09-06 PROCEDURE — 36415 COLL VENOUS BLD VENIPUNCTURE: CPT

## 2020-09-06 PROCEDURE — 2500000003 HC RX 250 WO HCPCS: Performed by: NURSE ANESTHETIST, CERTIFIED REGISTERED

## 2020-09-06 PROCEDURE — 1210000000 HC MED SURG R&B

## 2020-09-06 PROCEDURE — 6360000002 HC RX W HCPCS: Performed by: HOSPITALIST

## 2020-09-06 PROCEDURE — 2580000003 HC RX 258: Performed by: NURSE ANESTHETIST, CERTIFIED REGISTERED

## 2020-09-06 PROCEDURE — 73502 X-RAY EXAM HIP UNI 2-3 VIEWS: CPT

## 2020-09-06 DEVICE — HEAD FEM DIA28MM NK L+1.5MM HIP MTL ON MTL 12/14 TAPR: Type: IMPLANTABLE DEVICE | Site: HIP | Status: FUNCTIONAL

## 2020-09-06 DEVICE — STEM FEM SZ 12 L130MM NK L38.5MM 135DEG 41MM OFFSET STD HIP: Type: IMPLANTABLE DEVICE | Site: HIP | Status: FUNCTIONAL

## 2020-09-06 DEVICE — IMPL HIP FEM HEAD SELF CTR BIPLR 52X28MM: Type: IMPLANTABLE DEVICE | Site: HIP | Status: FUNCTIONAL

## 2020-09-06 RX ORDER — HYDROMORPHONE HYDROCHLORIDE 1 MG/ML
0.25 INJECTION, SOLUTION INTRAMUSCULAR; INTRAVENOUS; SUBCUTANEOUS EVERY 5 MIN PRN
Status: DISCONTINUED | OUTPATIENT
Start: 2020-09-06 | End: 2020-09-06 | Stop reason: HOSPADM

## 2020-09-06 RX ORDER — MEPERIDINE HYDROCHLORIDE 50 MG/ML
12.5 INJECTION INTRAMUSCULAR; INTRAVENOUS; SUBCUTANEOUS EVERY 5 MIN PRN
Status: DISCONTINUED | OUTPATIENT
Start: 2020-09-06 | End: 2020-09-06 | Stop reason: HOSPADM

## 2020-09-06 RX ORDER — HYDRALAZINE HYDROCHLORIDE 20 MG/ML
5 INJECTION INTRAMUSCULAR; INTRAVENOUS EVERY 10 MIN PRN
Status: DISCONTINUED | OUTPATIENT
Start: 2020-09-06 | End: 2020-09-06 | Stop reason: HOSPADM

## 2020-09-06 RX ORDER — SODIUM CHLORIDE 0.9 % (FLUSH) 0.9 %
10 SYRINGE (ML) INJECTION PRN
Status: DISCONTINUED | OUTPATIENT
Start: 2020-09-06 | End: 2020-09-11 | Stop reason: HOSPADM

## 2020-09-06 RX ORDER — OXYCODONE HYDROCHLORIDE 5 MG/1
5 TABLET ORAL EVERY 4 HOURS PRN
Status: DISCONTINUED | OUTPATIENT
Start: 2020-09-06 | End: 2020-09-11 | Stop reason: HOSPADM

## 2020-09-06 RX ORDER — MORPHINE SULFATE 4 MG/ML
4 INJECTION, SOLUTION INTRAMUSCULAR; INTRAVENOUS
Status: DISCONTINUED | OUTPATIENT
Start: 2020-09-06 | End: 2020-09-07

## 2020-09-06 RX ORDER — KETOROLAC TROMETHAMINE 30 MG/ML
INJECTION, SOLUTION INTRAMUSCULAR; INTRAVENOUS PRN
Status: DISCONTINUED | OUTPATIENT
Start: 2020-09-06 | End: 2020-09-06 | Stop reason: SDUPTHER

## 2020-09-06 RX ORDER — MIDAZOLAM HYDROCHLORIDE 1 MG/ML
INJECTION INTRAMUSCULAR; INTRAVENOUS PRN
Status: DISCONTINUED | OUTPATIENT
Start: 2020-09-06 | End: 2020-09-06 | Stop reason: SDUPTHER

## 2020-09-06 RX ORDER — HYDROMORPHONE HYDROCHLORIDE 1 MG/ML
0.5 INJECTION, SOLUTION INTRAMUSCULAR; INTRAVENOUS; SUBCUTANEOUS EVERY 5 MIN PRN
Status: DISCONTINUED | OUTPATIENT
Start: 2020-09-06 | End: 2020-09-06 | Stop reason: HOSPADM

## 2020-09-06 RX ORDER — ONDANSETRON 2 MG/ML
INJECTION INTRAMUSCULAR; INTRAVENOUS PRN
Status: DISCONTINUED | OUTPATIENT
Start: 2020-09-06 | End: 2020-09-06 | Stop reason: SDUPTHER

## 2020-09-06 RX ORDER — CEFAZOLIN SODIUM 1 G/3ML
INJECTION, POWDER, FOR SOLUTION INTRAMUSCULAR; INTRAVENOUS PRN
Status: DISCONTINUED | OUTPATIENT
Start: 2020-09-06 | End: 2020-09-06 | Stop reason: SDUPTHER

## 2020-09-06 RX ORDER — MORPHINE SULFATE 4 MG/ML
2 INJECTION, SOLUTION INTRAMUSCULAR; INTRAVENOUS
Status: DISCONTINUED | OUTPATIENT
Start: 2020-09-06 | End: 2020-09-07

## 2020-09-06 RX ORDER — LABETALOL HYDROCHLORIDE 5 MG/ML
5 INJECTION, SOLUTION INTRAVENOUS EVERY 10 MIN PRN
Status: DISCONTINUED | OUTPATIENT
Start: 2020-09-06 | End: 2020-09-06 | Stop reason: HOSPADM

## 2020-09-06 RX ORDER — ROCURONIUM BROMIDE 10 MG/ML
INJECTION, SOLUTION INTRAVENOUS PRN
Status: DISCONTINUED | OUTPATIENT
Start: 2020-09-06 | End: 2020-09-06 | Stop reason: SDUPTHER

## 2020-09-06 RX ORDER — PROMETHAZINE HYDROCHLORIDE 25 MG/ML
6.25 INJECTION, SOLUTION INTRAMUSCULAR; INTRAVENOUS
Status: DISCONTINUED | OUTPATIENT
Start: 2020-09-06 | End: 2020-09-06 | Stop reason: HOSPADM

## 2020-09-06 RX ORDER — MORPHINE SULFATE 4 MG/ML
2 INJECTION, SOLUTION INTRAMUSCULAR; INTRAVENOUS EVERY 5 MIN PRN
Status: DISCONTINUED | OUTPATIENT
Start: 2020-09-06 | End: 2020-09-06 | Stop reason: HOSPADM

## 2020-09-06 RX ORDER — DIPHENHYDRAMINE HYDROCHLORIDE 50 MG/ML
12.5 INJECTION INTRAMUSCULAR; INTRAVENOUS
Status: DISCONTINUED | OUTPATIENT
Start: 2020-09-06 | End: 2020-09-06 | Stop reason: HOSPADM

## 2020-09-06 RX ORDER — DIPHENHYDRAMINE HCL 25 MG
25 TABLET ORAL EVERY 6 HOURS PRN
Status: DISCONTINUED | OUTPATIENT
Start: 2020-09-06 | End: 2020-09-10

## 2020-09-06 RX ORDER — FENTANYL CITRATE 50 UG/ML
INJECTION, SOLUTION INTRAMUSCULAR; INTRAVENOUS PRN
Status: DISCONTINUED | OUTPATIENT
Start: 2020-09-06 | End: 2020-09-06 | Stop reason: SDUPTHER

## 2020-09-06 RX ORDER — DIAZEPAM 5 MG/1
5 TABLET ORAL EVERY 6 HOURS PRN
Status: DISCONTINUED | OUTPATIENT
Start: 2020-09-06 | End: 2020-09-07

## 2020-09-06 RX ORDER — OXYCODONE HYDROCHLORIDE 5 MG/1
10 TABLET ORAL EVERY 4 HOURS PRN
Status: DISCONTINUED | OUTPATIENT
Start: 2020-09-06 | End: 2020-09-11 | Stop reason: HOSPADM

## 2020-09-06 RX ORDER — SENNA AND DOCUSATE SODIUM 50; 8.6 MG/1; MG/1
1 TABLET, FILM COATED ORAL 2 TIMES DAILY
Status: DISCONTINUED | OUTPATIENT
Start: 2020-09-06 | End: 2020-09-11 | Stop reason: HOSPADM

## 2020-09-06 RX ORDER — MORPHINE SULFATE 4 MG/ML
4 INJECTION, SOLUTION INTRAMUSCULAR; INTRAVENOUS EVERY 5 MIN PRN
Status: DISCONTINUED | OUTPATIENT
Start: 2020-09-06 | End: 2020-09-06 | Stop reason: HOSPADM

## 2020-09-06 RX ORDER — PROPOFOL 10 MG/ML
INJECTION, EMULSION INTRAVENOUS PRN
Status: DISCONTINUED | OUTPATIENT
Start: 2020-09-06 | End: 2020-09-06 | Stop reason: SDUPTHER

## 2020-09-06 RX ORDER — SODIUM CHLORIDE, SODIUM LACTATE, POTASSIUM CHLORIDE, CALCIUM CHLORIDE 600; 310; 30; 20 MG/100ML; MG/100ML; MG/100ML; MG/100ML
INJECTION, SOLUTION INTRAVENOUS CONTINUOUS PRN
Status: DISCONTINUED | OUTPATIENT
Start: 2020-09-06 | End: 2020-09-06 | Stop reason: SDUPTHER

## 2020-09-06 RX ORDER — METOCLOPRAMIDE HYDROCHLORIDE 5 MG/ML
10 INJECTION INTRAMUSCULAR; INTRAVENOUS
Status: DISCONTINUED | OUTPATIENT
Start: 2020-09-06 | End: 2020-09-06 | Stop reason: HOSPADM

## 2020-09-06 RX ORDER — LIDOCAINE HYDROCHLORIDE 10 MG/ML
INJECTION, SOLUTION INFILTRATION; PERINEURAL PRN
Status: DISCONTINUED | OUTPATIENT
Start: 2020-09-06 | End: 2020-09-06 | Stop reason: SDUPTHER

## 2020-09-06 RX ORDER — DEXAMETHASONE SODIUM PHOSPHATE 10 MG/ML
INJECTION, SOLUTION INTRAMUSCULAR; INTRAVENOUS PRN
Status: DISCONTINUED | OUTPATIENT
Start: 2020-09-06 | End: 2020-09-06 | Stop reason: SDUPTHER

## 2020-09-06 RX ORDER — ENALAPRILAT 2.5 MG/2ML
1.25 INJECTION INTRAVENOUS
Status: DISCONTINUED | OUTPATIENT
Start: 2020-09-06 | End: 2020-09-06 | Stop reason: HOSPADM

## 2020-09-06 RX ORDER — CYCLOBENZAPRINE HCL 10 MG
10 TABLET ORAL 3 TIMES DAILY PRN
Status: DISCONTINUED | OUTPATIENT
Start: 2020-09-06 | End: 2020-09-11 | Stop reason: HOSPADM

## 2020-09-06 RX ORDER — SUCCINYLCHOLINE CHLORIDE 20 MG/ML
INJECTION INTRAMUSCULAR; INTRAVENOUS PRN
Status: DISCONTINUED | OUTPATIENT
Start: 2020-09-06 | End: 2020-09-06 | Stop reason: SDUPTHER

## 2020-09-06 RX ORDER — SODIUM CHLORIDE, SODIUM LACTATE, POTASSIUM CHLORIDE, CALCIUM CHLORIDE 600; 310; 30; 20 MG/100ML; MG/100ML; MG/100ML; MG/100ML
INJECTION, SOLUTION INTRAVENOUS CONTINUOUS
Status: DISCONTINUED | OUTPATIENT
Start: 2020-09-06 | End: 2020-09-11 | Stop reason: HOSPADM

## 2020-09-06 RX ORDER — SODIUM CHLORIDE 0.9 % (FLUSH) 0.9 %
10 SYRINGE (ML) INJECTION EVERY 12 HOURS SCHEDULED
Status: DISCONTINUED | OUTPATIENT
Start: 2020-09-06 | End: 2020-09-11 | Stop reason: HOSPADM

## 2020-09-06 RX ADMIN — PHENYLEPHRINE HYDROCHLORIDE 160 MCG: 10 INJECTION INTRAVENOUS at 13:33

## 2020-09-06 RX ADMIN — CEFAZOLIN 2000 MG: 330 INJECTION, POWDER, FOR SOLUTION INTRAMUSCULAR; INTRAVENOUS at 13:28

## 2020-09-06 RX ADMIN — LIDOCAINE HYDROCHLORIDE 50 MG: 10 INJECTION, SOLUTION INFILTRATION; PERINEURAL at 13:13

## 2020-09-06 RX ADMIN — KETOROLAC TROMETHAMINE 10 MG: 30 INJECTION, SOLUTION INTRAMUSCULAR; INTRAVENOUS at 13:55

## 2020-09-06 RX ADMIN — MIDAZOLAM 1 MG: 1 INJECTION INTRAMUSCULAR; INTRAVENOUS at 13:09

## 2020-09-06 RX ADMIN — PHENYLEPHRINE HYDROCHLORIDE 240 MCG: 10 INJECTION INTRAVENOUS at 13:20

## 2020-09-06 RX ADMIN — ROCURONIUM BROMIDE 30 MG: 10 INJECTION, SOLUTION INTRAVENOUS at 13:26

## 2020-09-06 RX ADMIN — SODIUM CHLORIDE, SODIUM LACTATE, POTASSIUM CHLORIDE, AND CALCIUM CHLORIDE: 600; 310; 30; 20 INJECTION, SOLUTION INTRAVENOUS at 13:53

## 2020-09-06 RX ADMIN — HYDROMORPHONE HYDROCHLORIDE 1 MG: 1 INJECTION, SOLUTION INTRAMUSCULAR; INTRAVENOUS; SUBCUTANEOUS at 10:33

## 2020-09-06 RX ADMIN — DIPHENHYDRAMINE HCL 50 MG: 25 TABLET ORAL at 21:23

## 2020-09-06 RX ADMIN — FENTANYL CITRATE 50 MCG: 50 INJECTION INTRAMUSCULAR; INTRAVENOUS at 13:13

## 2020-09-06 RX ADMIN — DEXAMETHASONE SODIUM PHOSPHATE 10 MG: 10 INJECTION, SOLUTION INTRAMUSCULAR; INTRAVENOUS at 13:23

## 2020-09-06 RX ADMIN — METOPROLOL SUCCINATE 50 MG: 50 TABLET, EXTENDED RELEASE ORAL at 09:03

## 2020-09-06 RX ADMIN — PROPOFOL 140 MG: 10 INJECTION, EMULSION INTRAVENOUS at 13:13

## 2020-09-06 RX ADMIN — SODIUM CHLORIDE, SODIUM LACTATE, POTASSIUM CHLORIDE, AND CALCIUM CHLORIDE: 600; 310; 30; 20 INJECTION, SOLUTION INTRAVENOUS at 15:42

## 2020-09-06 RX ADMIN — FOLIC ACID: 5 INJECTION, SOLUTION INTRAMUSCULAR; INTRAVENOUS; SUBCUTANEOUS at 02:49

## 2020-09-06 RX ADMIN — SODIUM CHLORIDE, SODIUM LACTATE, POTASSIUM CHLORIDE, AND CALCIUM CHLORIDE: 600; 310; 30; 20 INJECTION, SOLUTION INTRAVENOUS at 13:09

## 2020-09-06 RX ADMIN — PHENYLEPHRINE HYDROCHLORIDE 160 MCG: 10 INJECTION INTRAVENOUS at 13:53

## 2020-09-06 RX ADMIN — LORAZEPAM 2 MG: 1 TABLET ORAL at 21:24

## 2020-09-06 RX ADMIN — ONDANSETRON HYDROCHLORIDE 4 MG: 2 INJECTION, SOLUTION INTRAMUSCULAR; INTRAVENOUS at 13:55

## 2020-09-06 RX ADMIN — SUCCINYLCHOLINE CHLORIDE 100 MG: 20 INJECTION, SOLUTION INTRAMUSCULAR; INTRAVENOUS at 13:13

## 2020-09-06 RX ADMIN — HYDROMORPHONE HYDROCHLORIDE 1 MG: 1 INJECTION, SOLUTION INTRAMUSCULAR; INTRAVENOUS; SUBCUTANEOUS at 23:24

## 2020-09-06 RX ADMIN — SODIUM CHLORIDE, PRESERVATIVE FREE 10 ML: 5 INJECTION INTRAVENOUS at 21:25

## 2020-09-06 RX ADMIN — Medication 2 G: at 21:22

## 2020-09-06 RX ADMIN — PHENYLEPHRINE HYDROCHLORIDE 160 MCG: 10 INJECTION INTRAVENOUS at 13:16

## 2020-09-06 RX ADMIN — DOCUSATE SODIUM 50MG AND SENNOSIDES 8.6MG 1 TABLET: 8.6; 5 TABLET, FILM COATED ORAL at 21:23

## 2020-09-06 RX ADMIN — SUGAMMADEX 150 MG: 100 INJECTION, SOLUTION INTRAVENOUS at 13:58

## 2020-09-06 RX ADMIN — PHENYLEPHRINE HYDROCHLORIDE 160 MCG: 10 INJECTION INTRAVENOUS at 13:18

## 2020-09-06 RX ADMIN — FENTANYL CITRATE 25 MCG: 50 INJECTION INTRAMUSCULAR; INTRAVENOUS at 13:39

## 2020-09-06 ASSESSMENT — PAIN SCALES - GENERAL
PAINLEVEL_OUTOF10: 0
PAINLEVEL_OUTOF10: 5
PAINLEVEL_OUTOF10: 4

## 2020-09-06 ASSESSMENT — LIFESTYLE VARIABLES: SMOKING_STATUS: 0

## 2020-09-06 NOTE — PROGRESS NOTES
PHARMACY NOTE:  Michelle Gaines was ordered Fish Oil. As per the [facility], herbals and certain dietary supplements will be discontinued.   The herbal or dietary supplement may be continued after discharge from the hospital.    Electronically signed by Panda Norman, [address] [facility] on 9/5/2020 at 9:01 PM

## 2020-09-06 NOTE — PROGRESS NOTES
Progress Note    Date:9/6/2020       Room:0540/540-01  Patient Name:Andrea Clemente     YOB: 1945     Age:74 y.o. Subjective   Interval History Status: not changed. Patient seen and examined this a.m. family member present at the bedside. Patient states pain without moving 6/10 upon moving unbearable. Currently n.p.o. and plans for orthopedic intervention, informed of COVID screening being not detected. Patient currently denying any headache, change in vision, chest pain, shortness of breath, abdominal pain, nausea vomiting, fevers or chills. Last bowel movement yesterday    Cumulative hospital course: Patient was transferred to [facility] surgical unit from [facility] due to right femoral neck fracture. Per patient states he had a period of passing out, attempted to answer the doorbell fell did not hit his head. CT head without contrast noted to be no acute cranial process. History of alcohol abuse, last known drink was 4:30 AM, consumes approximately half a pint of vodka.,  With 19 screening not detected, plans for orthopedic intervention, preoperative H/H 12.5/38.5    Review of Systems   Review of Systems  ROS: 14 point review of systems is negative except as specifically addressed above.   Medications   Scheduled Meds:    sodium chloride flush  10 mL Intravenous 2 times per day    rosuvastatin  20 mg Oral Daily    ramipril  10 mg Oral Daily    NIFEdipine  30 mg Oral Daily    metoprolol succinate  50 mg Oral Daily    ARIPiprazole  2 mg Oral Daily    diphenhydrAMINE  50 mg Oral Nightly    escitalopram  20 mg Oral Daily    sodium chloride flush  10 mL Intravenous 2 times per day    thiamine  100 mg Oral Daily    thiamine  100 mg Intravenous Daily    multivitamin  1 tablet Oral Daily    [START ON 9/8/2020] LORazepam  2 mg Oral Once    LORazepam  2 mg Oral TID    [START ON 9/7/2020] LORazepam  2 mg Oral BID     Continuous Infusions:   PRN Meds: sodium chloride flush, naloxone, acetaminophen **OR** acetaminophen, nitroGLYCERIN, HYDROmorphone, HYDROmorphone, HYDROmorphone, sodium chloride flush, LORazepam **OR** LORazepam **OR** LORazepam **OR** LORazepam **OR** LORazepam **OR** LORazepam **OR** LORazepam **OR** LORazepam    Past History    Past Medical History:   has a past medical history of CAD (coronary artery disease), Hyperlipidemia, Hypertension, Myocardial infarction ([address] and Tobacco abuse. Social History:   reports that he quit smoking about 8 years ago. His smoking use included cigarettes. He has a 25.00 pack-year smoking history. His smokeless tobacco use includes chew. He reports current alcohol use. He reports that he does not use drugs. Family History:   Family History   Problem Relation Age of Onset    Heart Surgery Brother         stent placement, CABG    Other Brother         uses tobacco    Other Mother          MVA   Junaid Filter Emphysema Father         former smoker, had worked in a \"stove plant\"    Other Son         paralyzed s/p MVA, then    Junaid Filter No Known Problems Son     Drug Abuse Daughter        Physical Examination      Vitals:  BP (!) 143/88   Pulse 108   Temp 97.8 °F (36.6 °C) (Temporal)   Resp 20   Ht 6' 1\" (1.854 m)   Wt 165 lb (74.8 kg)   SpO2 91%   BMI 21.77 kg/m²   Temp (24hrs), Av °F (36.7 °C), Min:97.8 °F (36.6 °C), Max:98.3 °F (36.8 °C)      I/O (24Hr): Intake/Output Summary (Last 24 hours) at 2020 1002  Last data filed at 2020 0203  Gross per 24 hour   Intake --   Output 300 ml   Net -300 ml       Physical Exam  Vitals signs and nursing note reviewed. Constitutional:       Appearance: He is not ill-appearing or toxic-appearing. HENT:      Head: Normocephalic and atraumatic. Nose: Nose normal.   Eyes:      General:         Right eye: No discharge. Left eye: No discharge. Conjunctiva/sclera: Conjunctivae normal.   Cardiovascular:      Rate and Rhythm: Normal rate and regular rhythm. Abdominal:      Tenderness: There is no abdominal tenderness. There is no guarding or rebound. Musculoskeletal:         General: Tenderness present. Right lower leg: No edema. Left lower leg: No edema. Comments: Range of motion diminished right lower extremity when compared to that of the left   Skin:     General: Skin is warm. Capillary Refill: Capillary refill takes less than 2 seconds. Neurological:      General: No focal deficit present. Mental Status: He is alert and oriented to person, place, and time. Mental status is at baseline. Psychiatric:         Mood and Affect: Mood normal.         Labs/Imaging/Diagnostics   Labs:  CBC:  Recent Labs     09/05/20 2031 09/06/20 0316   WBC 5.4 5.6   RBC 3.69* 3.73*   HGB 12.4* 12.5*   HCT 36.5* 38.5*   MCV 98.9* 103.2*   RDW 12.5 12.6   * 110*     CHEMISTRIES:  Recent Labs     09/05/20 2031 09/06/20 0316   * 133*   K 4.1 4.2   CL 94* 90*   CO2 19* 19*   BUN 9 11   CREATININE 0.6 0.7   GLUCOSE 66* 80     PT/INR:  Recent Labs     09/05/20 2031   PROTIME 13.7   INR 1.05     APTT:  Recent Labs     09/05/20 2031   APTT 27.6     LIVER PROFILE:  Recent Labs     09/05/20 2031   *   *   BILITOT 0.7   ALKPHOS 101       Imaging Last 24 Hours:  No results found.       Assessment        Hospital Problems           Last Modified POA    Closed comminuted intertrochanteric fracture of proximal end of right femur ([address] 9/5/2020 Yes    Alcohol dependence, continuous ([address] 9/5/2020 Yes    Alcohol withdrawal hallucinosis ([address] 9/5/2020 Yes          Plan:        Closed comminuted intertrochanteric fracture of the proximal end of the right femur  -Status post fall 9/5/2020  -Pain currently 6/10 without movement, continue with pain modalities as needed  -Preoperative H/H 12.5/38.5  -Orthopedic surgery consulted appreciate further recommendation  -COVID-19 screening not detected 9/6/2020  -Recommendation to scrutinize patient's respiratory status in the postoperative period, continue incentive spirometry efforts, PT/OT in the postoperative period, case management for safe discharge disposition    Alcohol dependence/alcohol withdrawal  -CIWA protocol in place, lorazepam as needed  -Banana bag  -Seizure precautions  -Thiamine/folate/multivitamin    Dyslipidemia   - Controlled   - Continue Statin therapy   Lab Results   Component Value Date    CHOL 151 (L) 07/08/2019   ,   Lab Results   Component Value Date    HDL 60 07/08/2019     Lab Results   Component Value Date    LDLCALC 77 07/08/2019   ,   Lab Results   Component Value Date    TRIG 72 07/08/2019       Essential hypertension-chronic condition, continue with antihypertensive regimen, routine vitals    Depression/anxiety/mood disorder-chronic conditions, continue with Lexapro/Abilify        Electronically signed by   Anda Burkitt   Internal Medicine Hospitalist  On 9/6/2020  At 10:02 AM    EMR Dragon/Transcription disclaimer:   Much of this encounter note is an electronic transcription/translation of spoken language to printed text.  The electronic translation of spoken language may permit erroneous, or at times, nonsensical words or phrases to be inadvertently transcribed; although attempts have made to review the note for such errors, some may still exist.

## 2020-09-06 NOTE — PROGRESS NOTES
I had a long discussion with family postop.     They are extremely concerned with his alcohol intake and would like maximum treatment for this while admitted.     Electronically signed by Juan Myers MD on 9/6/2020 at 4:54 PM

## 2020-09-06 NOTE — OP NOTE
Patient Name: Catarina Cash: 1945  MRN: [id number] [facility] [address] of SURGERY: 9/6/2020    SURGEON: Elis Whaley MD    ASSISTANT: NONE    Preoperative Diagnosis:  Acute traumatic displaced subcapital fracture of the neck of the Right femur, initial encounter for closed fracture     Postoperative Diagnosis:  Acute traumatic displaced subcapital fracture of the neck of the Right femur, initial encounter for closed fracture     Procedure Performed: Right Hip Hemiarthroplasty    Implants: One Arch Sujit with the following components:            52/1.5/28 mm bipolar head           12 mm press fit stem    Anesthesia Used: GETA    Operative Indications: 76 y.o. male status post fall with a displaced femoral neck fracture. He is an alcoholic with cardiac disease. Surgical indications include fracture displacement, pain control, ability to mobilize the patient, and prevent sequelae of prolonged bedrest (DVT, pneumonia, skin ulceration). Risk include, but are not limited to, bleeding, infection, pain, damage to neurovascular structures, leg length inequality, hip dislocation, blood clots, intraoperative death. Risks, benefits, and alternatives were discussed and the patient wished to proceed. He is at increased risk of complications postop given his alcoholism and cardiac disease. Estimated Blood Loss: 150 mL    Drains: None    Specimens: None    Complications: None    Procedure In Detail:  The patient was seen in the preoperative holding room, the informed consent was reviewed and signed, and the correct operative extremity marked with the patients agreement. The patient was transported to the operating room, where a timeout was performed identifying the correct patient and operative site. Perioperative antibiotics were administered prior to incision.   Once placed under general anesthesia, the patient was positioned in the lateral decubitus position and all bony prominences were padded. An axillary roll was placed. The extremity was prepped and draped in the usual sterile fashion. A posterior approach to the hip was utilized as a slightly curved incision was made centered from the posterior aspect of the greater trochanter. Soft tissue was dissected in-line with the incision and the tensor fascia and gluteus varsha muscle were split. The trochanteric bursa was excised revealing the short external rotators of the hip, which were tagged and incised from the posterolateral aspect of the greater trochanter. A T-shaped capsulotomy was performed and with progressive internal rotation of the hip the fracture was noted. While protecting soft tissue structures, a standard femoral neck cut was made with an oscillating saw approximately one finger-breadth proximal to the lesser trochanter. The native femoral head was removed with a corkscrew device and taken the back table and measured. Preparation of the femoral canal was then performed, first with a box-cut chisel, followed by a canal finder, lateralizing device, and sequential broaches up to a stable fit. Trial components were inserted, the hip was reduced showing excellent stability and approximately equal leg lengths. The trial components were removed, final implants impacted without complication with findings as described. The incision was thoroughly irrigated followed by closure of the capsule with 0-vicryl, re-approximation of the short external rotators with #2 fiberwire suture, and closure of the wound in layers. The skin was closed with adhesive glue. A soft tissue dressing was placed. The patient was placed supine on a hospital bed, legs lengths again checked showing them to be equal and a knee immobilizer was placed. The patient was awakened by anesthesia transported to the PACU in stable condition.       POSTOPERATIVE PLAN: Admit inpatient for monitoring, PT/OT, 3 weeks of DVT prophylaxis, and IV antibiotics for 24 hrs.   Weight bear as tolerated with posterior hip precautions.     Electronically signed by Grover Napier MD on 9/6/2020 at 2:31 PM

## 2020-09-06 NOTE — ANESTHESIA PRE PROCEDURE
Department of Anesthesiology  Preprocedure Note       Name:  Adi Marie   Age:  76 y.o.  :  1945                                          MRN:  [id number]         Date:  2020      Surgeon: La Oneill):  Jovana Hamilton MD    Procedure: Procedure(s):  HIP HEMIARTHROPLASTY    Medications prior to admission:   Prior to Admission medications    Medication Sig Start Date End Date Taking? Authorizing Provider   ARIPiprazole (ABILIFY) 2 MG tablet Take 2 mg by mouth daily   Yes Historical Provider, MD   escitalopram (LEXAPRO) 20 MG tablet Take 20 mg by mouth daily   Yes Historical Provider, MD   ramipril (ALTACE) 10 MG capsule TAKE ONE CAPSULE BY MOUTH TWICE DAILY 20  Yes SYMONE Watts CNP   NIFEdipine (ADALAT CC) 30 MG extended release tablet TAKE 1 TABLET BY MOUTH DAILY 20  Yes SYMONE Howe   metoprolol succinate (TOPROL XL) 50 MG extended release tablet TAKE ONE TABLET EVERY DAY 3/17/20  Yes SYMONE Howe   nitroGLYCERIN (NITROSTAT) 0.4 MG SL tablet Dissolve 1 tablet under tongue for chest pain and repeat every 5 min up to max of 3 total doses. If no relief after 3 doses call 911 19  Yes SYMONE Howe   rosuvastatin (CRESTOR) 20 MG tablet Take 1 tablet by mouth daily 5/3/19  Yes SYMONE Albert   DiphenhydrAMINE HCl, Sleep, (EQ SLEEP AID) 50 MG CAPS Take  by mouth. Yes Historical Provider, MD   aspirin 81 MG EC tablet Take 81 mg by mouth daily.      Yes Historical Provider, MD   fish oil-omega-3 fatty acids 1000 MG capsule Take 1,000 mg by mouth daily    Yes Historical Provider, MD       Current medications:    Current Facility-Administered Medications   Medication Dose Route Frequency Provider Last Rate Last Dose    HYDROmorphone HCl PF (DILAUDID) injection 0.25 mg  0.25 mg Intravenous Q5 Min PRN Willma Dakin, APRN - CRNA        HYDROmorphone HCl PF (DILAUDID) injection 0.5 mg  0.5 mg Intravenous Q5 Min PRN Willma Dakin, APRN - CRNA       [facility] (TOPROL XL) extended release tablet 50 mg  50 mg Oral Daily Bola Hodges MD   50 mg at 09/06/20 0903    [MAR Hold] ARIPiprazole (ABILIFY) tablet 2 mg  2 mg Oral Daily Bola Hodges MD   Stopped at 09/06/20 0913    [MAR Hold] diphenhydrAMINE (BENADRYL) tablet 50 mg  50 mg Oral Nightly Bola Hodges MD   50 mg at 09/05/20 2157    [MAR Hold] escitalopram (LEXAPRO) tablet 20 mg  20 mg Oral Daily Bola Hodges MD   Stopped at 09/06/20 0913    [MAR Hold] HYDROmorphone HCl PF (DILAUDID) injection 0.5 mg  0.5 mg Intravenous Q4H PRN Bola Hodges MD        [facility] Hold] HYDROmorphone HCl PF (DILAUDID) injection 2 mg  2 mg Intravenous Q4H PRN Bola Hodges MD        [facility] Hold] HYDROmorphone HCl PF (DILAUDID) injection 1 mg  1 mg Intravenous Q4H PRN Bola Hodges MD   1 mg at 09/06/20 1033    [MAR Hold] sodium chloride flush 0.9 % injection 10 mL  10 mL Intravenous 2 times per day Bola Hodges MD   10 mL at 09/05/20 2157    [MAR Hold] sodium chloride flush 0.9 % injection 10 mL  10 mL Intravenous PRN Bola Hodges MD        [facility] Hold] vitamin B-1 (THIAMINE) tablet 100 mg  100 mg Oral Daily Bola Hodges MD   Stopped at 09/06/20 0915    [MAR Hold] thiamine (B-1) injection 100 mg  100 mg Intravenous Daily Bola Hodges MD   Stopped at 09/06/20 0914    [MAR Hold] multivitamin 1 tablet  1 tablet Oral Daily Bola Hodges MD   Stopped at 09/06/20 0913    [MAR Hold] LORazepam (ATIVAN) tablet 1 mg  1 mg Oral Q1H PRN Bola Hodges MD        Or   Lexii Erwin [facility] Hold] LORazepam (ATIVAN) injection 1 mg  1 mg Intravenous Q1H PRN Bola Hodges MD        Or   [facility] Hold] LORazepam (ATIVAN) tablet 2 mg  2 mg Oral Q1H PRN Bola Hodges MD        Or   [facility] Hold] LORazepam (ATIVAN) injection 2 mg  2 mg Intravenous Q1H PRN Bola Hodges MD        Or   [facility] Hold] LORazepam (ATIVAN) tablet 3 mg  3 mg Oral Q1H PRN Bola Hodges MD        Or   [facility] Hold] LORazepam (ATIVAN) injection 3 mg  3 mg Intravenous Q1H PRN Isaak Jesus Suzy Israel MD        Or   [employer] Hold] LORazepam (ATIVAN) tablet 4 mg  4 mg Oral Q1H PRN Autumn Weinberg MD        Or   Concetta Polanco [facility] Hold] LORazepam (ATIVAN) injection 4 mg  4 mg Intravenous Q1H PRN Autumn Weinberg MD        [facility] Hold] LORazepam (ATIVAN) tablet 2 mg  2 mg Oral Once Autumn Weinberg MD        [facility] Hold] LORazepam (ATIVAN) tablet 2 mg  2 mg Oral TID Autumn Weinberg MD   Stopped at 09/06/20 0913    [MAR Hold] LORazepam (ATIVAN) tablet 2 mg  2 mg Oral BID Autumn Weinberg MD           Allergies:  No Known Allergies    Problem List:    Patient Active Problem List   Diagnosis Code    Smoker F17.200    Myocardial infarction ([address] I21.9    Hyperlipidemia E78.5    Essential hypertension I10    CAD (coronary artery disease) I25.10    Coronary artery disease involving native coronary artery of native heart without angina pectoris I25.10    ACS (acute coronary syndrome) ([address] I24.9    Closed comminuted intertrochanteric fracture of proximal end of right femur ([address] S72.141A    Alcohol dependence, continuous ([address] F10.20    Alcohol withdrawal hallucinosis ([address] P90.243       Past Medical History:        Diagnosis Date    CAD (coronary artery disease)     Hyperlipidemia     Dr. Naeem Schultz Hypertension     subendocardial    Myocardial infarction ([address]     Tobacco abuse        Past Surgical History:        Procedure Laterality Date    CARDIAC CATHETERIZATION  2002    EF in excess of 50%    CARDIAC CATHETERIZATION  12/06/2016    Two bare metal stents to right coronary artery.   Usman Mayers M.D.    CORONARY ANGIOPLASTY WITH STENT PLACEMENT      DIAGNOSTIC CARDIAC CATH LAB PROCEDURE      HERNIA REPAIR      multiple: 03/25/02 right groin, had done both sides ~1980    TONSILLECTOMY      as a child       Social History:    Social History     Tobacco Use    Smoking status: Former Smoker     Packs/day: 25.00     Years: 1.00     Pack years: 25.00     Types: Cigarettes     Last attempt to quit: 5/1/2012 Years since quittin.3    Smokeless tobacco: Current User     Types: Chew    Tobacco comment: chews a can a day,    Substance Use Topics    Alcohol use: Yes     Comment: 3/4 pint per day of liqour, never had a problem with not drinking                                Ready to quit: Not Answered  Counseling given: Not Answered  Comment: chews a can a day,       Vital Signs (Current):   Vitals:    20 0203 20 0637 20 0909 20 1026   BP: (!) 140/90 (!) 143/88  (!) 167/104   Pulse: 120 116 108 102   Resp:    Temp: 98.2 °F (36.8 °C) 97.8 °F (36.6 °C)  97.4 °F (36.3 °C)   TempSrc: Temporal Temporal  Temporal   SpO2: 90% 91%  91%   Weight:       Height:                                                  BP Readings from Last 3 Encounters:   20 (!) 167/104   20 98/72   19 (!) 132/90       NPO Status:                                                                                 BMI:   Wt Readings from Last 3 Encounters:   20 165 lb (74.8 kg)   20 171 lb (77.6 kg)   19 180 lb (81.6 kg)     Body mass index is 21.77 kg/m². CBC:   Lab Results   Component Value Date    WBC 5.6 2020    RBC 3.73 2020    HGB 12.5 2020    HCT 38.5 2020    .2 2020    RDW 12.6 2020     2020       CMP:   Lab Results   Component Value Date     2020    K 4.2 2020    CL 90 2020    CO2 19 2020    BUN 11 2020    CREATININE 0.7 2020    GFRAA >59 2020    LABGLOM >60 2020    GLUCOSE 80 2020    PROT 7.0 2020    CALCIUM 9.5 2020    BILITOT 0.7 2020    ALKPHOS 101 2020     2020     2020       POC Tests: No results for input(s): POCGLU, POCNA, POCK, POCCL, POCBUN, POCHEMO, POCHCT in the last 72 hours.     Coags:   Lab Results   Component Value Date    PROTIME 13.7 2020    INR 1.05 2020    APTT 27.6 2020

## 2020-09-06 NOTE — ANESTHESIA POSTPROCEDURE EVALUATION
Department of Anesthesiology  Postprocedure Note    Patient: Carlos Pena  MRN: [id number]  YOB: 1945  Date of evaluation: 9/6/2020  Time:  2:25 PM     Procedure Summary     Date:  09/06/20 Room / Location:  [address]    Anesthesia Start:  5968 Anesthesia Stop:  7719    Procedure:  HIP HEMIARTHROPLASTY (Right ) Diagnosis:  (hip fracture)    Surgeon:  Carter Snowden MD Responsible Provider:  SYMONE Tan CRNA    Anesthesia Type:  general ASA Status:  3          Anesthesia Type: general    Jed Phase I:      Jed Phase II:      Last vitals: Reviewed and per EMR flowsheets.        Anesthesia Post Evaluation    Patient location during evaluation: PACU  Patient participation: complete - patient participated  Level of consciousness: sleepy but conscious  Pain score: 0  Airway patency: patent  Nausea & Vomiting: no nausea and no vomiting  Complications: no  Cardiovascular status: hemodynamically stable  Respiratory status: acceptable and nasal cannula  Hydration status: euvolemic

## 2020-09-06 NOTE — H&P
Patient Information:  Patient: Alyx Rivera  MRN: [id number]   Acct: [de-identified]  YOB: 1945  Admit Date: 9/5/2020      Date of Service: 9/5/2020  Primary Care Physician: SYMONE Mock CNP  Advance Directive: Full Code  Health Care Proxy: his wife, Mrs. Zack Dominguez, [phone]        SUBJECTIVE:    No chief complaint on file. EP Sign Out as per day team (copied from their note):  \"Mr. Cydney Chavez, a 63-year-old male, history of HTN, HLD, CAD, transferred from [facility]), on account of right femoral neck fracture. Patient reportedly \"passed out\", and an attempt to answer the door, and fell, without any head impact. CT head without contrast reportedly with no acute cranial process. \"  \"Patient transferred to [facility], for further work-up and management, including orthopedics surgery consultation. \"    HPI:  Mr. Alyx Rivera is a pleasant young-for-age 76year old  [place] gentleman from home. He was at home when he stood up from dozing in his chair, he became very light headed and then passed out. He fell on to concrete but did not strike his head, his head landed on his arm. Longest without drinking in years now, last drink was at 04:30am, he drinks a half pint of vodka. He has shaking now, has auditory and visual and tactile halucinations. Review of Systems:   Review of Systems   Constitutional: Positive for appetite change and fatigue. Negative for chills, diaphoresis, fever and unexpected weight change. HENT: Negative for congestion. Respiratory: Negative for shortness of breath. Cardiovascular: Positive for leg swelling. Negative for chest pain and palpitations. Gastrointestinal: Negative for abdominal pain, constipation, diarrhea and nausea. Neurological: Positive for syncope and weakness. Negative for light-headedness. Psychiatric/Behavioral: Negative for confusion.      Past Medical History:   Diagnosis Date    CAD (coronary artery disease)     Hyperlipidemia     Dr. Hernandez Cousin Hypertension     subendocardial    Myocardial infarction ([facility] [address]     Tobacco abuse      Past Psychiatric History:  Denies any    Past Surgical History:   Procedure Laterality Date    CARDIAC CATHETERIZATION      EF in excess of 50%    CARDIAC CATHETERIZATION  2016    Two bare metal stents to right coronary artery.   Charlie Galdamez M.D.    CORONARY ANGIOPLASTY WITH STENT PLACEMENT      DIAGNOSTIC CARDIAC CATH LAB PROCEDURE      HERNIA REPAIR      multiple: 02 right groin, had done both sides ~1980    TONSILLECTOMY      as a child     Social History     Tobacco History     Smoking Status  Former Smoker Quit date  2012 Smoking Frequency  25 packs/day for 1 years (25 pk yrs) Smoking Tobacco Type  Cigarettes    Smokeless Tobacco Use  Current User Smokeless Tobacco Type  Chew    Tobacco Comment  chews a can a day,           Alcohol History     Alcohol Use Status  Yes Comment  3/4 pint per day of liqour, never had a problem with not drinking          Drug Use     Drug Use Status  Never          Sexual Activity     Sexually Active  Not Asked Comment  has 3 kids, here with his wife            CODE STATUS: Full Code  HEALTH CARE PROXY: his wife, Mrs. Nat Horne, [phone]  AMBULATES: independently but with difficulty since PNA 2020  DOMICILED: lives in a private house, lives alone with wife, has outside pets, has stairs to basement    Family History   Problem Relation Age of Onset    Heart Surgery Brother         stent placement, CABG    Other Brother         uses tobacco    Other Mother          MVA   [place] Emphysema Father         former smoker, had worked in a \"[employer]\"    Other Son         paralyzed s/p MVA, then     No Known Problems Son     Drug Abuse Daughter      Allergies  No Known Allergies    Current Facility-Administered Medications   Medication Dose Route Frequency Provider Last Rate Last Dose    sodium chloride flush 0.9 % injection 10 mL  10 mL Intravenous 2 times per day Loyd Collins MD        sodium chloride flush 0.9 % injection 10 mL  10 mL Intravenous PRN Loyd Collins MD        naloxone [facility]) injection 0.4 mg  0.4 mg Intravenous PRN Gaurang Hale MD        morphine injection 1 mg  1 mg Intravenous Q4H PRN Gaurang Hale MD        morphine injection 4 mg  4 mg Intravenous Q4H PRN Gaurang Hale MD   4 mg at 09/05/20 2044    morphine injection 2 mg  2 mg Intravenous Q4H PRN Gaurang Hale MD        acetaminophen (TYLENOL) tablet 650 mg  650 mg Oral Q6H PRN Gaurang Hale MD        Or    acetaminophen (TYLENOL) suppository 650 mg  650 mg Rectal Q6H PRN Gaurang Hale MD        [START ON 9/6/2020] rosuvastatin (CRESTOR) tablet 20 mg  20 mg Oral Daily Gaurang Hale MD        [START ON 9/6/2020] ramipril (ALTACE) capsule 10 mg  10 mg Oral Daily Gaurang Hale MD        nitroGLYCERIN (NITROSTAT) SL tablet 0.4 mg  0.4 mg Sublingual Q5 Min PRN Gaurang Hale MD        [START ON 9/6/2020] NIFEdipine (PROCARDIA XL) extended release tablet 30 mg  30 mg Oral Daily Garuang Hale MD        [START ON 9/6/2020] metoprolol succinate (TOPROL XL) extended release tablet 50 mg  50 mg Oral Daily Gaurang Hale MD        [START ON 9/6/2020] ARIPiprazole (ABILIFY) tablet 2 mg  2 mg Oral Daily Gaurang Hale MD        diphenhydrAMINE (BENADRYL) tablet 50 mg  50 mg Oral Nightly Gaurang Hale MD        Summer Nurse ON 9/6/2020] escitalopram (LEXAPRO) tablet 20 mg  20 mg Oral Daily Gaurang Hale MD         Home Medications:  Prior to Admission medications    Medication Sig Start Date End Date Taking?  Authorizing Provider   ARIPiprazole (ABILIFY) 2 MG tablet Take 2 mg by mouth daily   Yes Historical Provider, MD   escitalopram (LEXAPRO) 20 MG tablet Take 20 mg by mouth daily   Yes Historical Provider, MD   ramipril (ALTACE) 10 MG capsule TAKE ONE CAPSULE BY MOUTH TWICE DAILY 5/26/20  Yes Tom Byers, APRN - CNP   NIFEdipine (ADALAT CC) 30 MG extended release tablet TAKE 1 TABLET BY MOUTH DAILY 5/26/20  Yes SYMONE Francis   metoprolol succinate (TOPROL XL) 50 MG extended release tablet TAKE ONE TABLET EVERY DAY 3/17/20  Yes SYMONE Francis   nitroGLYCERIN (NITROSTAT) 0.4 MG SL tablet Dissolve 1 tablet under tongue for chest pain and repeat every 5 min up to max of 3 total doses. If no relief after 3 doses call 911 6/24/19  Yes SYMONE Francis   rosuvastatin (CRESTOR) 20 MG tablet Take 1 tablet by mouth daily 5/3/19  Yes SYMONE Reed   DiphenhydrAMINE HCl, Sleep, (EQ SLEEP AID) 50 MG CAPS Take  by mouth. Yes Historical Provider, MD   aspirin 81 MG EC tablet Take 81 mg by mouth daily. Yes Historical Provider, MD   fish oil-omega-3 fatty acids 1000 MG capsule Take 1,000 mg by mouth daily    Yes Historical Provider, MD         OBJECTIVE:    Vitals:    09/05/20 2007   BP: (!) 154/96   Pulse: 109   Resp: 18   Temp: 98.3 °F (36.8 °C)   SpO2: 91%   breathing on NC    BP (!) 154/96   Pulse 109   Temp 98.3 °F (36.8 °C) (Temporal)   Resp 18   SpO2 91%     No intake or output data in the 24 hours ending 09/05/20 2119    Physical Exam  Vitals signs reviewed. Constitutional:       General: He is not in acute distress. Appearance: Normal appearance. He is normal weight. He is not ill-appearing or toxic-appearing. HENT:      Head: Normocephalic and atraumatic. Nose: No congestion or rhinorrhea. Eyes:      General:         Right eye: No discharge. Left eye: No discharge. Neck:      Musculoskeletal: Neck supple. Comments: Trachea appears midline  Cardiovascular:      Rate and Rhythm: Normal rate and regular rhythm. Heart sounds: No murmur. No friction rub. No gallop. Pulmonary:      Effort: Pulmonary effort is normal. No respiratory distress. Breath sounds: No stridor. No wheezing, rhonchi or rales. Chest:      Chest wall: No tenderness.    Abdominal: General: Bowel sounds are normal.      Palpations: Abdomen is soft. Tenderness: There is no abdominal tenderness. There is no guarding or rebound. Musculoskeletal:      Right lower leg: No edema. Left lower leg: No edema. Comments: RLE shortened and externally rotated   Skin:     General: Skin is warm. Comments: nondiaphoretic   Neurological:      Mental Status: He is alert. Comments: Has intact gross touch sense to big toes, has intact capillary refill, temp b/y symmetric, wiggles toes b/l   Psychiatric:         Mood and Affect: Mood normal.         Behavior: Behavior normal.       LABORATORY DATA:    CBC:   Recent Labs     09/05/20 2031   WBC 5.4   HGB 12.4*   HCT 36.5*   *     BMP:   Recent Labs     09/05/20 2031   *   K 4.1   CL 94*   CO2 19*   BUN 9   CREATININE 0.6   CALCIUM 9.7     Hepatic Profile:   Recent Labs     09/05/20 2031   *   *   BILITOT 0.7   ALKPHOS 101     Coag Panel:   Recent Labs     09/05/20 2031   INR 1.05   PROTIME 13.7   APTT 27.6     Cardiac Enzymes: No results for input(s): CKTOTAL, TROPONINI in the last 72 hours. Pro-BNP: No results for input(s): PROBNP in the last 72 hours. A1C: No results for input(s): LABA1C in the last 72 hours. TSH: Invalid input(s): LABTSH    ABG: No results for input(s): PHART, XIE5KVJ, PO2ART, NOG5YDZ, BEART, HGBAE, Z0MMDNZV, CARBOXHGBART in the last 72 hours.     Urinalysis: No results found for: NITRU, WBCUA, BACTERIA, RBCUA, BLOODU, SPECGRAV, GLUCOSEU        ASESSMENTS & PLANS:    Right Hip Intertochanteric Fx s/p syncope and collapse after stanbding up quickly:  Admit to medical [facility] under hospitlaist team  Ortho consult  Morphine Pain Scale failed - will use dilaudid  Narcan PRN  Type and screen in AM    Alcohol Withdrawal:  Last drink 04:30AM, uses a 1/2 pint daily of vodka  Multivitamin, Thiamine, Folic Acid - all daily  CIWA scale dosed ativan  Seizure and fall precautions  Ativan taper    Tobacco Abuse:  Counseled for >3 minutes      Chronic Medical Problems:  continue home regimen as indicated      Patient Active Problem List   Diagnosis    Smoker    [facility]    Hyperlipidemia    Essential hypertension    CAD (coronary artery disease)    Coronary artery disease involving native coronary artery of native heart without angina pectoris    ACS (acute coronary syndrome) ([facility] [address]    Closed comminuted intertrochanteric fracture of proximal end of right femur (HCC)      sodium chloride flush  10 mL Intravenous 2 times per day    [START ON 9/6/2020] rosuvastatin  20 mg Oral Daily    [START ON 9/6/2020] ramipril  10 mg Oral Daily    [START ON 9/6/2020] NIFEdipine  30 mg Oral Daily    [START ON 9/6/2020] metoprolol succinate  50 mg Oral Daily    [START ON 9/6/2020] ARIPiprazole  2 mg Oral Daily    diphenhydrAMINE  50 mg Oral Nightly    [START ON 9/6/2020] escitalopram  20 mg Oral Daily       Supoportive and Prophylactic Txx:  DVT Prophylaxis: will defer to ortho - not to have on AM of Sx anyways  GI (PUD) Ppx: not indicated as such  PT consult for evalutation and Txx as indicated: will defer to ortho  Diet NPO Effective Now Exceptions are: Sips with Meds  Diet NPO, After Midnight Exceptions are: Sips with Meds  sodium chloride flush, naloxone, morphine, morphine, morphine, acetaminophen **OR** acetaminophen, nitroGLYCERIN      Care time of >45 minutes, & counseled >3 min for tobacco cessation  Pt seen/examined and admitted to inpatient status. Inpatient status is used for patients with an expected LOS extending past two midnights due to medical therapy and or critical care needs, otherwise patients are placed to OBServation status. Signed:  Electronically signed by Leon Conn MD on 9/5/20 at 09:43 PM CDT.

## 2020-09-06 NOTE — CONSULTS
Orthopaedic Inpatient Consultation    NAME:  Sunil Client   : 1945  MRN: [id number]    2020  7:44 PM    Requesting Physician: Dr. Sandoval Quale:  right hip pain      HISTORY OF PRESENT ILLNESS:   The patient is a 76 y.o. male status post fall at home sustaining a right hip fracture. He was transferred from [facility]. He is an alcoholic and reported passed out after standing to answer his door at home. Pain is located in the right hip and rated a 4/5, constant, dull, worse with movement, better with rest and medication. There are no associated symptoms. Past Medical History:        Diagnosis Date    CAD (coronary artery disease)     Hyperlipidemia     Dr. Fischer Brain Hypertension     subendocardial    Myocardial infarction ([facility] [address]     Tobacco abuse        Past Surgical History:        Procedure Laterality Date    CARDIAC CATHETERIZATION      EF in excess of 50%    CARDIAC CATHETERIZATION  2016    Two bare metal stents to right coronary artery. Eloise Mazariegos M.D.    CORONARY ANGIOPLASTY WITH STENT PLACEMENT      DIAGNOSTIC CARDIAC CATH LAB PROCEDURE      HERNIA REPAIR      multiple: 02 right groin, had done both sides ~    TONSILLECTOMY      as a child       Current Medications:   Prior to Admission medications    Medication Sig Start Date End Date Taking?  Authorizing Provider   ARIPiprazole (ABILIFY) 2 MG tablet Take 2 mg by mouth daily   Yes Historical Provider, MD   escitalopram (LEXAPRO) 20 MG tablet Take 20 mg by mouth daily   Yes Historical Provider, MD   ramipril (ALTACE) 10 MG capsule TAKE ONE CAPSULE BY MOUTH TWICE DAILY 20  Yes SYMONE Akins - CNP   NIFEdipine (ADALAT CC) 30 MG extended release tablet TAKE 1 TABLET BY MOUTH DAILY 20  Yes SYMONE Cade   metoprolol succinate (TOPROL XL) 50 MG extended release tablet TAKE ONE TABLET EVERY DAY 3/17/20  Yes SYMONE Cade   nitroGLYCERIN (NITROSTAT) 0.4 MG SL tablet Dissolve 1 tablet under tongue for chest pain and repeat every 5 min up to max of 3 total doses. If no relief after 3 doses call 911 19  Yes SYMONE Bose   rosuvastatin (CRESTOR) 20 MG tablet Take 1 tablet by mouth daily 5/3/19  Yes Andria Bloch, APRN   DiphenhydrAMINE HCl, Sleep, (EQ SLEEP AID) 50 MG CAPS Take  by mouth. Yes Historical Provider, MD   aspirin 81 MG EC tablet Take 81 mg by mouth daily. Yes Historical Provider, MD   fish oil-omega-3 fatty acids 1000 MG capsule Take 1,000 mg by mouth daily    Yes Historical Provider, MD       Allergies:  Patient has no known allergies. Social History:   History of smoking, chews tobacco now  +EtOH      Family History:   Family History   Problem Relation Age of Onset    Heart Surgery Brother         stent placement, CABG    Other Brother         uses tobacco    Other Mother          MVA   [place] Emphysema Father         former smoker, had worked in a \"stove plant\"    Other Son         paralyzed s/p MVA, then    [place]na Arch No Known Problems Son     Drug Abuse Daughter        REVIEW OF SYSTEMS:  14 point review of systems has been reviewed from the patient's emergency room visit, reviewed with the patient on today's date with no new changes. PHYSICAL EXAM:      Physical Examination:  Vitals:   Vitals:    20 0203 20 0637 20 0909 20 1026   BP: (!) 140/90 (!) 143/88  (!) 167/104   Pulse: 120 116 108 102   Resp: 16 20  20   Temp: 98.2 °F (36.8 °C) 97.8 °F (36.6 °C)  97.4 °F (36.3 °C)   TempSrc: Temporal Temporal  Temporal   SpO2: 90% 91%  91%   Weight:       Height:         General:  Appears stated age, no distress. Orientation:  Alert and oriented to time, place, and person. Mood and Affect:  Cooperative and pleasant. Gait:  Resting comfortably in bed. Cardiovascular:  Symmetric 1-2 plus pulses in upper and lower extremities. Lymph:  No cervical or inguinal lymphadenopathy noted.   Sensation: Grossly intact to light touch. DTR:  Normal, no pathologic reflexes. Coordination/balance:  Normal    Musculoskeletal:  Right upper extremity exam:  There is no tenderness to palpation about the shoulder, elbow, wrist or hand. Unrestricted full function motion is present. Stability is normal with provocative tests, 5/5 strength, and skin is normal.      Left upper extremity exam:  There is no tenderness to palpation about the shoulder, elbow, wrist or hand. Unrestricted full function motion is present. Stability is normal with provocative tests, 5/5 strength, and skin is normal.     Right lower extremity exam:  Tenderness right hip, shortened/externally rotated, refuses motion/stability/strength, skin intact    Left lower extremity exam:  There is no tenderness to palpation about the hip, knee, ankle or foot. Unrestricted full function motion is present.   Stability is normal with provocative tests, 5/5 strength, and skin is normal.      DATA:    CBC with Differential:    Lab Results   Component Value Date    WBC 5.6 09/06/2020    RBC 3.73 09/06/2020    HGB 12.5 09/06/2020    HCT 38.5 09/06/2020     09/06/2020    .2 09/06/2020    MCH 33.5 09/06/2020    MCHC 32.5 09/06/2020    RDW 12.6 09/06/2020    LYMPHOPCT 13.1 09/06/2020    MONOPCT 17.3 09/06/2020    BASOPCT 0.7 09/06/2020    MONOSABS 1.00 09/06/2020    LYMPHSABS 0.7 09/06/2020    EOSABS 0.00 09/06/2020    BASOSABS 0.00 09/06/2020     CMP:    Lab Results   Component Value Date     09/06/2020    K 4.2 09/06/2020    CL 90 09/06/2020    CO2 19 09/06/2020    BUN 11 09/06/2020    CREATININE 0.7 09/06/2020    GFRAA >59 09/06/2020    LABGLOM >60 09/06/2020    GLUCOSE 80 09/06/2020    PROT 7.0 09/05/2020    CALCIUM 9.5 09/06/2020    BILITOT 0.7 09/05/2020    ALKPHOS 101 09/05/2020     09/05/2020     09/05/2020     BMP:    Lab Results   Component Value Date     09/06/2020    K 4.2 09/06/2020    CL 90 09/06/2020    CO2 19

## 2020-09-07 LAB
ANION GAP SERPL CALCULATED.3IONS-SCNC: 13 MMOL/L (ref 7–19)
BASOPHILS ABSOLUTE: 0 K/UL (ref 0–0.2)
BASOPHILS RELATIVE PERCENT: 0.2 % (ref 0–1)
BUN BLDV-MCNC: 14 MG/DL (ref 8–23)
CALCIUM SERPL-MCNC: 8.9 MG/DL (ref 8.8–10.2)
CHLORIDE BLD-SCNC: 100 MMOL/L (ref 98–111)
CO2: 25 MMOL/L (ref 22–29)
CREAT SERPL-MCNC: 0.6 MG/DL (ref 0.5–1.2)
EOSINOPHILS ABSOLUTE: 0 K/UL (ref 0–0.6)
EOSINOPHILS RELATIVE PERCENT: 0 % (ref 0–5)
GFR AFRICAN AMERICAN: >59
GFR NON-AFRICAN AMERICAN: >60
GLUCOSE BLD-MCNC: 173 MG/DL (ref 74–109)
GLUCOSE BLD-MCNC: 193 MG/DL (ref 70–99)
HCT VFR BLD CALC: 26.7 % (ref 42–52)
HEMOGLOBIN: 8.8 G/DL (ref 14–18)
IMMATURE GRANULOCYTES #: 0 K/UL
LYMPHOCYTES ABSOLUTE: 0.5 K/UL (ref 1.1–4.5)
LYMPHOCYTES RELATIVE PERCENT: 9.2 % (ref 20–40)
MCH RBC QN AUTO: 33 PG (ref 27–31)
MCHC RBC AUTO-ENTMCNC: 33 G/DL (ref 33–37)
MCV RBC AUTO: 100 FL (ref 80–94)
MONOCYTES ABSOLUTE: 0.8 K/UL (ref 0–0.9)
MONOCYTES RELATIVE PERCENT: 15.2 % (ref 0–10)
NEUTROPHILS ABSOLUTE: 3.7 K/UL (ref 1.5–7.5)
NEUTROPHILS RELATIVE PERCENT: 74.8 % (ref 50–65)
PDW BLD-RTO: 12.6 % (ref 11.5–14.5)
PERFORMED ON: ABNORMAL
PLATELET # BLD: 84 K/UL (ref 130–400)
PMV BLD AUTO: 9.6 FL (ref 9.4–12.4)
POTASSIUM REFLEX MAGNESIUM: 4 MMOL/L (ref 3.5–5)
RBC # BLD: 2.67 M/UL (ref 4.7–6.1)
SODIUM BLD-SCNC: 138 MMOL/L (ref 136–145)
WBC # BLD: 5 K/UL (ref 4.8–10.8)

## 2020-09-07 PROCEDURE — 80048 BASIC METABOLIC PNL TOTAL CA: CPT

## 2020-09-07 PROCEDURE — 6370000000 HC RX 637 (ALT 250 FOR IP): Performed by: FAMILY MEDICINE

## 2020-09-07 PROCEDURE — 6370000000 HC RX 637 (ALT 250 FOR IP): Performed by: HOSPITALIST

## 2020-09-07 PROCEDURE — 6360000002 HC RX W HCPCS: Performed by: ORTHOPAEDIC SURGERY

## 2020-09-07 PROCEDURE — 2580000003 HC RX 258: Performed by: FAMILY MEDICINE

## 2020-09-07 PROCEDURE — 2580000003 HC RX 258: Performed by: ORTHOPAEDIC SURGERY

## 2020-09-07 PROCEDURE — 6360000002 HC RX W HCPCS: Performed by: HOSPITALIST

## 2020-09-07 PROCEDURE — 2580000003 HC RX 258: Performed by: HOSPITALIST

## 2020-09-07 PROCEDURE — 2700000000 HC OXYGEN THERAPY PER DAY

## 2020-09-07 PROCEDURE — 85025 COMPLETE CBC W/AUTO DIFF WBC: CPT

## 2020-09-07 PROCEDURE — 2000000000 HC ICU R&B

## 2020-09-07 PROCEDURE — 6360000002 HC RX W HCPCS: Performed by: FAMILY MEDICINE

## 2020-09-07 PROCEDURE — 2500000003 HC RX 250 WO HCPCS: Performed by: HOSPITALIST

## 2020-09-07 PROCEDURE — 82947 ASSAY GLUCOSE BLOOD QUANT: CPT

## 2020-09-07 PROCEDURE — 2500000003 HC RX 250 WO HCPCS: Performed by: FAMILY MEDICINE

## 2020-09-07 RX ORDER — HYDRALAZINE HYDROCHLORIDE 20 MG/ML
5 INJECTION INTRAMUSCULAR; INTRAVENOUS EVERY 6 HOURS PRN
Status: DISCONTINUED | OUTPATIENT
Start: 2020-09-07 | End: 2020-09-11 | Stop reason: HOSPADM

## 2020-09-07 RX ORDER — CHLORDIAZEPOXIDE HYDROCHLORIDE 25 MG/1
25 CAPSULE, GELATIN COATED ORAL 4 TIMES DAILY
Status: DISCONTINUED | OUTPATIENT
Start: 2020-09-07 | End: 2020-09-09

## 2020-09-07 RX ORDER — LABETALOL HYDROCHLORIDE 5 MG/ML
5 INJECTION, SOLUTION INTRAVENOUS EVERY 6 HOURS PRN
Status: DISCONTINUED | OUTPATIENT
Start: 2020-09-07 | End: 2020-09-11 | Stop reason: HOSPADM

## 2020-09-07 RX ORDER — DEXMEDETOMIDINE HYDROCHLORIDE 4 UG/ML
0.2 INJECTION, SOLUTION INTRAVENOUS CONTINUOUS
Status: DISCONTINUED | OUTPATIENT
Start: 2020-09-07 | End: 2020-09-08

## 2020-09-07 RX ADMIN — ESCITALOPRAM OXALATE 20 MG: 10 TABLET ORAL at 08:08

## 2020-09-07 RX ADMIN — SODIUM CHLORIDE, PRESERVATIVE FREE 10 ML: 5 INJECTION INTRAVENOUS at 08:09

## 2020-09-07 RX ADMIN — CHLORDIAZEPOXIDE HYDROCHLORIDE 25 MG: 25 CAPSULE ORAL at 13:29

## 2020-09-07 RX ADMIN — DOCUSATE SODIUM 50MG AND SENNOSIDES 8.6MG 1 TABLET: 8.6; 5 TABLET, FILM COATED ORAL at 20:48

## 2020-09-07 RX ADMIN — METOPROLOL SUCCINATE 50 MG: 50 TABLET, EXTENDED RELEASE ORAL at 08:08

## 2020-09-07 RX ADMIN — SODIUM CHLORIDE, PRESERVATIVE FREE 10 ML: 5 INJECTION INTRAVENOUS at 20:48

## 2020-09-07 RX ADMIN — Medication 2 G: at 05:13

## 2020-09-07 RX ADMIN — CHLORDIAZEPOXIDE HYDROCHLORIDE 25 MG: 25 CAPSULE ORAL at 20:48

## 2020-09-07 RX ADMIN — CHLORDIAZEPOXIDE HYDROCHLORIDE 25 MG: 25 CAPSULE ORAL at 17:52

## 2020-09-07 RX ADMIN — ARIPIPRAZOLE 2 MG: 2 TABLET ORAL at 08:08

## 2020-09-07 RX ADMIN — DOCUSATE SODIUM 50MG AND SENNOSIDES 8.6MG 1 TABLET: 8.6; 5 TABLET, FILM COATED ORAL at 08:08

## 2020-09-07 RX ADMIN — ENOXAPARIN SODIUM 40 MG: 40 INJECTION SUBCUTANEOUS at 03:33

## 2020-09-07 RX ADMIN — LORAZEPAM 3 MG: 2 INJECTION INTRAMUSCULAR; INTRAVENOUS at 18:16

## 2020-09-07 RX ADMIN — LORAZEPAM 3 MG: 2 INJECTION INTRAMUSCULAR; INTRAVENOUS at 00:05

## 2020-09-07 RX ADMIN — FOLIC ACID: 5 INJECTION, SOLUTION INTRAMUSCULAR; INTRAVENOUS; SUBCUTANEOUS at 08:34

## 2020-09-07 RX ADMIN — NIFEDIPINE 30 MG: 30 TABLET, FILM COATED, EXTENDED RELEASE ORAL at 08:08

## 2020-09-07 RX ADMIN — CHLORDIAZEPOXIDE HYDROCHLORIDE 25 MG: 25 CAPSULE ORAL at 08:08

## 2020-09-07 RX ADMIN — SODIUM CHLORIDE, SODIUM LACTATE, POTASSIUM CHLORIDE, AND CALCIUM CHLORIDE: 600; 310; 30; 20 INJECTION, SOLUTION INTRAVENOUS at 20:47

## 2020-09-07 RX ADMIN — SODIUM CHLORIDE, SODIUM LACTATE, POTASSIUM CHLORIDE, AND CALCIUM CHLORIDE: 600; 310; 30; 20 INJECTION, SOLUTION INTRAVENOUS at 01:10

## 2020-09-07 RX ADMIN — OXYCODONE HYDROCHLORIDE 10 MG: 5 TABLET ORAL at 09:01

## 2020-09-07 RX ADMIN — ROSUVASTATIN CALCIUM 20 MG: 20 TABLET, FILM COATED ORAL at 08:08

## 2020-09-07 RX ADMIN — DIPHENHYDRAMINE HCL 50 MG: 25 TABLET ORAL at 20:47

## 2020-09-07 RX ADMIN — DEXMEDETOMIDINE HYDROCHLORIDE 0.2 MCG/KG/HR: 4 INJECTION, SOLUTION INTRAVENOUS at 01:10

## 2020-09-07 ASSESSMENT — PAIN SCALES - GENERAL
PAINLEVEL_OUTOF10: 7
PAINLEVEL_OUTOF10: 2
PAINLEVEL_OUTOF10: 0
PAINLEVEL_OUTOF10: 0
PAINLEVEL_OUTOF10: 3

## 2020-09-07 NOTE — PROGRESS NOTES
Orthopedic Surgery Progress Note    Carlos Mcgill  9/7/2020      Subjective:     Systemic or Specific Complaints:  Alert this am.  C/o minimal pain at hip. Objective:     Patient Vitals for the past 24 hrs:   BP Temp Temp src Pulse Resp SpO2 Height Weight   09/07/20 0800 (!) 142/87 -- -- 91 22 (!) 77 % -- --   09/07/20 0700 121/75 97.6 °F (36.4 °C) -- 86 19 94 % -- --   09/07/20 0632 -- -- -- -- 17 96 % -- --   09/07/20 0600 113/70 -- -- 75 18 96 % -- --   09/07/20 0500 (!) 134/90 98.2 °F (36.8 °C) Temporal 78 16 96 % -- --   09/07/20 0400 100/69 97.6 °F (36.4 °C) Temporal 80 13 97 % -- --   09/07/20 0300 92/64 -- -- 81 17 96 % -- --   09/07/20 0200 (!) 86/58 -- -- 87 11 96 % -- --   09/07/20 0100 121/80 -- -- 107 16 94 % 6' 1\" (1.854 m) 175 lb (79.4 kg)   09/07/20 0057 -- 97.6 °F (36.4 °C) Temporal 102 18 94 % -- --   09/07/20 0011 (!) 172/111 98 °F (36.7 °C) Temporal 141 26 (!) 86 % -- --   09/06/20 2242 105/72 96.7 °F (35.9 °C) Temporal 104 18 96 % -- --   09/06/20 2119 -- -- -- 104 -- 92 % -- --   09/06/20 1839 108/79 96.9 °F (36.1 °C) Temporal 89 18 95 % -- --   09/06/20 1607 (!) 149/95 96.9 °F (36.1 °C) Temporal 80 20 93 % -- --   09/06/20 1509 (!) 152/90 96.9 °F (36.1 °C) Temporal 80 20 94 % -- --   09/06/20 1450 (!) 121/93 -- -- 81 14 96 % -- --   09/06/20 1435 (!) 167/96 -- -- 89 15 97 % -- --   09/06/20 1430 (!) 163/99 -- -- 92 14 92 % -- --   09/06/20 1428 -- -- -- 94 -- -- -- --   09/06/20 1424 (!) 162/93 98.5 °F (36.9 °C) Temporal 96 17 92 % -- --       right lower  General: alert, appears stated age and cooperative   Wound: Covered.                Dressing: clean, dry, and intact   Extremity: Distal NVI           DVT Exam: No evidence of DVT seen on physical exam.                   Data Review:  Recent Labs     09/06/20  0316 09/07/20  0340   HGB 12.5* 8.8*     Recent Labs     09/07/20  0340      K 4.0   CREATININE 0.6       Assessment:     POD# 1, right hip mert    Plan:      1:  DVT prophylaxis, ICE, elevate  2:  Pain control  3:  Physical therapy/Occupational therapy  4:  Full weight bearing, with PHP, RLE.         Electronically signed by Geovanna Dent PA-C on 9/7/2020 at 12:15 PM

## 2020-09-07 NOTE — PROGRESS NOTES
Physical Therapy   Name: Alisia Samayoa  MRN:  [id number]  Date of service:  9/7/2020  Spoke with pt's RN, who states pt. is on Precedex drip for agitation. Will f/u 9/8/20.   Electronically signed by Beverly Lainez PT on 9/7/2020 at 1:16 PM

## 2020-09-07 NOTE — PROGRESS NOTES
Progress Note    Date:9/7/2020       Room:0142/142-01  Patient Name:Andrea Enamorado     YOB: 1945     Age:74 y.o. Subjective   Interval History Status: not changed. Patient seen postop day #1 status post right hip hemiarthroplasty. Overnight transition to the ICU due to tachycardia stemming from alcoholic withdrawal.  Patient currently on Precedex. Case discussed with nursing staff, initiation of Librium, banana bag. Patient currently intermittently confused no aggressive behavior seen. Cumulative hospital course: Patient was transferred to [facility] surgical unit from [facility] due to right femoral neck fracture. Per patient states he had a period of passing out, attempted to answer the doorbell fell did not hit his head. CT head without contrast noted to be no acute cranial process. History of alcohol abuse, last known drink was 4:30 AM, consumes approximately half a pint of vodka.,  COVID-19 not detected, patient underwent right hip hemiarthroplasty 9/6/2020. Transition to ICU due to symptoms of alcoholic withdrawal, placed on Precedex, initiation of Librium/banana bag.       Review of Systems   Review of Systems   Unable to perform ROS: Acuity of condition       Medications   Scheduled Meds:    folic acid, thiamine, multi-vitamin with vitamin K infusion   Intravenous Daily    chlordiazePOXIDE  25 mg Oral 4x Daily    sodium chloride flush  10 mL Intravenous 2 times per day    sennosides-docusate sodium  1 tablet Oral BID    enoxaparin  40 mg Subcutaneous Daily    rosuvastatin  20 mg Oral Daily    ramipril  10 mg Oral Daily    NIFEdipine  30 mg Oral Daily    metoprolol succinate  50 mg Oral Daily    ARIPiprazole  2 mg Oral Daily    diphenhydrAMINE  50 mg Oral Nightly    escitalopram  20 mg Oral Daily    LORazepam  2 mg Oral TID     Continuous Infusions:    dexmedetomidine HCl in NaCl 0.1 mcg/kg/hr (09/07/20 0600)    lactated ringers 100 mL/hr at 09/07/20 0600 Right eye: No discharge. Left eye: No discharge. Conjunctiva/sclera: Conjunctivae normal.   Cardiovascular:      Rate and Rhythm: Normal rate and regular rhythm. Pulmonary:      Comments: 2 L nasal cannula oxygen in place  Abdominal:      General: Bowel sounds are normal.      Tenderness: There is no abdominal tenderness. There is no guarding or rebound. Genitourinary:     Comments: Urethral catheter in place  Musculoskeletal:         General: Tenderness present. Right lower leg: No edema. Left lower leg: No edema. Comments: Postoperative incision/bandage right hip   Skin:     General: Skin is warm. Capillary Refill: Capillary refill takes less than 2 seconds. Neurological:      Comments: Confused, fidgety         Labs/Imaging/Diagnostics   Labs:  CBC:  Recent Labs     09/05/20 2031 09/06/20 0316 09/07/20  0340   WBC 5.4 5.6 5.0   RBC 3.69* 3.73* 2.67*   HGB 12.4* 12.5* 8.8*   HCT 36.5* 38.5* 26.7*   MCV 98.9* 103.2* 100.0*   RDW 12.5 12.6 12.6   * 110* 84*     CHEMISTRIES:  Recent Labs     09/05/20 2031 09/06/20 0316 09/07/20  0340   * 133* 138   K 4.1 4.2 4.0   CL 94* 90* 100   CO2 19* 19* 25   BUN 9 11 14   CREATININE 0.6 0.7 0.6   GLUCOSE 66* 80 173*     PT/INR:  Recent Labs     09/05/20 2031   PROTIME 13.7   INR 1.05     APTT:  Recent Labs     09/05/20 2031   APTT 27.6     LIVER PROFILE:  Recent Labs     09/05/20 2031   *   *   BILITOT 0.7   ALKPHOS 101       Imaging Last 24 Hours:  No results found.       Assessment        Hospital Problems           Last Modified POA    Closed comminuted intertrochanteric fracture of proximal end of right femur ([address] 9/5/2020 Yes    Alcohol dependence, continuous ([address] 9/5/2020 Yes    Alcohol withdrawal hallucinosis ([address] 9/5/2020 Yes          Plan:        Alcohol dependence/alcohol withdrawal  -Patient was transitioned to the ICU overnight, Precedex running  -Initiation of Librium  -CIWA protocol in place, lorazepam as needed  -Banana bag  -Seizure precautions  -Thiamine/folate/multivitamin    Closed comminuted intertrochanteric fracture of the proximal end of the right femur  -Postop day #1 status post right hip hemiarthroplasty  -Preoperative H/H 12.5/38.5  -postoperative H/H 8.8/26.7  -COVID-19 screening not detected 9/6/2020  -Recommendation to scrutinize patient's respiratory status in the postoperative period, continue incentive spirometry efforts, PT/OT in the postoperative period, case management for safe discharge disposition  -Orthopedic surgery consulted and following appreciate further recs, anticoagulation per orthopedics -currently on Lovenox 40 mg    Dyslipidemia   - Controlled   - Continue Statin therapy   Lab Results   Component Value Date    CHOL 151 (L) 07/08/2019   ,   Lab Results   Component Value Date    HDL 60 07/08/2019     Lab Results   Component Value Date    LDLCALC 77 07/08/2019   ,   Lab Results   Component Value Date    TRIG 72 07/08/2019       Essential hypertension-chronic condition, continue with antihypertensive regimen, routine vitals  -Currently uncontrolled, PRN modalities have been placed    Depression/anxiety/mood disorder-chronic conditions, continue with Lexapro/Abilify        Electronically signed by   Janie Bolton   Internal Medicine Hospitalist  On 9/7/2020  At 8:23 AM    EMR Dragon/Transcription disclaimer:   Much of this encounter note is an electronic transcription/translation of spoken language to printed text.  The electronic translation of spoken language may permit erroneous, or at times, nonsensical words or phrases to be inadvertently transcribed; although attempts have made to review the note for such errors, some may still exist.

## 2020-09-07 NOTE — PROGRESS NOTES
Pt remains on precedex gtt currently a 0.1mcg/kg/hr. RASS sedation -1. Pt is oriented to self, place, and situation. Pt states that he drinks approximately 1 quart of liquor per day and further states \"Ela been doing it for a while\". Pt also spit out pills this morning stating \"I dont want any more of that shit! \". Pt bp 147/114. Hospitalist notified of findings. Orders received for prn 5mg labetolol and prn 5mg hydrolazine ivp. Wife at bedside at this time. No questions at this time. Will continue to monitor.

## 2020-09-07 NOTE — PROGRESS NOTES
4 Eyes Skin Assessment    Alisia Samayoa is being assessed upon: Transfer to New Unit    I agree that Castillo Livingston, along with Jeff Rodriguez RN (either 2 RN's or 1 LPN and 1 RN) have performed a thorough Head to Toe Skin Assessment on the patient. ALL assessment sites listed below have been assessed. Areas assessed by both nurses:     [x]   Head, Face, and Ears   [x]   Shoulders, Back, and Chest  [x]   Arms, Elbows, and Hands   [x]   Coccyx, Sacrum, and Ischium  [x]   Legs, Feet, and Heels    Does the Patient have Skin Breakdown? Yes, wound(s) noted upon assessment. It is the responsibility of the Primary Nurse to assure that the following documentation, preventions, orders, and consults are complete on the above noted wound(s): Wound LDA initiated. LDA Flowsheet Documentation includes the Bee-wound, Wound Assessment, Measurements, Dressing Treatment, Drainage, and Color.  See LDA charting for surgical wound    Ricardo Prevention initiated: Yes  Wound Care Orders initiated: Yes    [id number]  nurse consulted for Pressure Injury (Stage 3,4, Unstageable, DTI, NWPT, and Complex wounds) and New or Established Ostomies: NA        Primary Nurse eSignature: John Vital RN on 9/7/2020 at 12:58 AM      Co-Signer eSignature: Electronically signed by Jeff Rodriguez RN on 9/7/20 at 1:01 AM CDT

## 2020-09-07 NOTE — PROGRESS NOTES
Patient seen and examined on the montes    Known well to me from admission yesterday evening  HR to 140s  Diaphoretic   Extremely tremulous  Still hallucinating  Has become aggressive intermittently      Needs precedex GGT  This will require ICU placement        No charge please for this note or service  Care time of ~5 minutes    Case d/w family (wife) at the bedside

## 2020-09-07 NOTE — CARE COORDINATION
will discuss etoh rehab when not having hallucinations and not aggressive  Electronically signed by CUAUHTEMOC Glynn on 9/7/2020 at 9:58 AM

## 2020-09-08 LAB
ANION GAP SERPL CALCULATED.3IONS-SCNC: 12 MMOL/L (ref 7–19)
BASOPHILS ABSOLUTE: 0 K/UL (ref 0–0.2)
BASOPHILS RELATIVE PERCENT: 0.4 % (ref 0–1)
BUN BLDV-MCNC: 12 MG/DL (ref 8–23)
CALCIUM SERPL-MCNC: 9.3 MG/DL (ref 8.8–10.2)
CHLORIDE BLD-SCNC: 103 MMOL/L (ref 98–111)
CO2: 25 MMOL/L (ref 22–29)
CREAT SERPL-MCNC: 0.5 MG/DL (ref 0.5–1.2)
EOSINOPHILS ABSOLUTE: 0.1 K/UL (ref 0–0.6)
EOSINOPHILS RELATIVE PERCENT: 2 % (ref 0–5)
GFR AFRICAN AMERICAN: >59
GFR NON-AFRICAN AMERICAN: >60
GLUCOSE BLD-MCNC: 96 MG/DL (ref 74–109)
HCT VFR BLD CALC: 24.9 % (ref 42–52)
HEMOGLOBIN: 8.3 G/DL (ref 14–18)
IMMATURE GRANULOCYTES #: 0 K/UL
LYMPHOCYTES ABSOLUTE: 0.9 K/UL (ref 1.1–4.5)
LYMPHOCYTES RELATIVE PERCENT: 17.4 % (ref 20–40)
MCH RBC QN AUTO: 33.9 PG (ref 27–31)
MCHC RBC AUTO-ENTMCNC: 33.3 G/DL (ref 33–37)
MCV RBC AUTO: 101.6 FL (ref 80–94)
MONOCYTES ABSOLUTE: 0.8 K/UL (ref 0–0.9)
MONOCYTES RELATIVE PERCENT: 15.6 % (ref 0–10)
NEUTROPHILS ABSOLUTE: 3.2 K/UL (ref 1.5–7.5)
NEUTROPHILS RELATIVE PERCENT: 64 % (ref 50–65)
PDW BLD-RTO: 12.8 % (ref 11.5–14.5)
PLATELET # BLD: 91 K/UL (ref 130–400)
PMV BLD AUTO: 10 FL (ref 9.4–12.4)
POTASSIUM REFLEX MAGNESIUM: 3.7 MMOL/L (ref 3.5–5)
RBC # BLD: 2.45 M/UL (ref 4.7–6.1)
SODIUM BLD-SCNC: 140 MMOL/L (ref 136–145)
WBC # BLD: 5.1 K/UL (ref 4.8–10.8)

## 2020-09-08 PROCEDURE — 6370000000 HC RX 637 (ALT 250 FOR IP): Performed by: FAMILY MEDICINE

## 2020-09-08 PROCEDURE — 97166 OT EVAL MOD COMPLEX 45 MIN: CPT

## 2020-09-08 PROCEDURE — 2580000003 HC RX 258: Performed by: FAMILY MEDICINE

## 2020-09-08 PROCEDURE — 80048 BASIC METABOLIC PNL TOTAL CA: CPT

## 2020-09-08 PROCEDURE — 97162 PT EVAL MOD COMPLEX 30 MIN: CPT

## 2020-09-08 PROCEDURE — 1210000000 HC MED SURG R&B

## 2020-09-08 PROCEDURE — 6360000002 HC RX W HCPCS: Performed by: HOSPITALIST

## 2020-09-08 PROCEDURE — 2580000003 HC RX 258: Performed by: HOSPITALIST

## 2020-09-08 PROCEDURE — 97530 THERAPEUTIC ACTIVITIES: CPT

## 2020-09-08 PROCEDURE — 97535 SELF CARE MNGMENT TRAINING: CPT

## 2020-09-08 PROCEDURE — 6360000002 HC RX W HCPCS: Performed by: FAMILY MEDICINE

## 2020-09-08 PROCEDURE — 36415 COLL VENOUS BLD VENIPUNCTURE: CPT

## 2020-09-08 PROCEDURE — 6370000000 HC RX 637 (ALT 250 FOR IP): Performed by: HOSPITALIST

## 2020-09-08 PROCEDURE — 85025 COMPLETE CBC W/AUTO DIFF WBC: CPT

## 2020-09-08 PROCEDURE — 2500000003 HC RX 250 WO HCPCS: Performed by: FAMILY MEDICINE

## 2020-09-08 RX ADMIN — CHLORDIAZEPOXIDE HYDROCHLORIDE 25 MG: 25 CAPSULE ORAL at 09:08

## 2020-09-08 RX ADMIN — ARIPIPRAZOLE 2 MG: 2 TABLET ORAL at 09:08

## 2020-09-08 RX ADMIN — SODIUM CHLORIDE, PRESERVATIVE FREE 10 ML: 5 INJECTION INTRAVENOUS at 21:14

## 2020-09-08 RX ADMIN — DIPHENHYDRAMINE HCL 50 MG: 25 TABLET ORAL at 21:14

## 2020-09-08 RX ADMIN — DOCUSATE SODIUM 50MG AND SENNOSIDES 8.6MG 1 TABLET: 8.6; 5 TABLET, FILM COATED ORAL at 21:14

## 2020-09-08 RX ADMIN — RAMIPRIL 10 MG: 10 CAPSULE ORAL at 12:21

## 2020-09-08 RX ADMIN — ENOXAPARIN SODIUM 40 MG: 40 INJECTION SUBCUTANEOUS at 09:06

## 2020-09-08 RX ADMIN — CHLORDIAZEPOXIDE HYDROCHLORIDE 25 MG: 25 CAPSULE ORAL at 21:14

## 2020-09-08 RX ADMIN — FOLIC ACID: 5 INJECTION, SOLUTION INTRAMUSCULAR; INTRAVENOUS; SUBCUTANEOUS at 08:55

## 2020-09-08 RX ADMIN — NIFEDIPINE 30 MG: 30 TABLET, FILM COATED, EXTENDED RELEASE ORAL at 09:08

## 2020-09-08 RX ADMIN — ESCITALOPRAM OXALATE 20 MG: 10 TABLET ORAL at 09:08

## 2020-09-08 RX ADMIN — METOPROLOL SUCCINATE 50 MG: 50 TABLET, EXTENDED RELEASE ORAL at 09:07

## 2020-09-08 RX ADMIN — CHLORDIAZEPOXIDE HYDROCHLORIDE 25 MG: 25 CAPSULE ORAL at 12:18

## 2020-09-08 RX ADMIN — DOCUSATE SODIUM 50MG AND SENNOSIDES 8.6MG 1 TABLET: 8.6; 5 TABLET, FILM COATED ORAL at 09:08

## 2020-09-08 RX ADMIN — CHLORDIAZEPOXIDE HYDROCHLORIDE 25 MG: 25 CAPSULE ORAL at 16:42

## 2020-09-08 RX ADMIN — SODIUM CHLORIDE, PRESERVATIVE FREE 10 ML: 5 INJECTION INTRAVENOUS at 09:08

## 2020-09-08 RX ADMIN — ROSUVASTATIN CALCIUM 20 MG: 20 TABLET, FILM COATED ORAL at 09:08

## 2020-09-08 ASSESSMENT — PAIN SCALES - GENERAL
PAINLEVEL_OUTOF10: 2
PAINLEVEL_OUTOF10: 0

## 2020-09-08 ASSESSMENT — PAIN DESCRIPTION - PAIN TYPE: TYPE: SURGICAL PAIN

## 2020-09-08 NOTE — PROGRESS NOTES
4 Eyes Skin Assessment    Gertrude Whittington is being assessed upon: Transfer to New Unit    I agree that Sona Callahan, along with Abril Chase RN (either 2 RN's or 1 LPN and 1 RN) have performed a thorough Head to Toe Skin Assessment on the patient. ALL assessment sites listed below have been assessed. Areas assessed by both nurses:     [x]   Head, Face, and Ears   [x]   Shoulders, Back, and Chest  [x]   Arms, Elbows, and Hands   [x]   Coccyx, Sacrum, and Ischium  [x]   Legs, Feet, and Heels    Does the Patient have Skin Breakdown? Yes, bruising to right lateral thigh and groin, skin tear to right elbow, scattered bruising to arms, NO evidence of skin breakdown on elbows, sacrum, or heels. Ricardo Prevention initiated: Yes  Wound Care Orders initiated: No    [facility] nurse consulted for Pressure Injury (Stage 3,4, Unstageable, DTI, NWPT, and Complex wounds) and New or Established Ostomies: No        Primary Nurse eSignature:  Lavell Prescott RN on 9/8/2020 at 12:34 PM      Co-Signer eSignature: Electronically signed by Berenice Corona RN on 9/8/20 at 12:44 PM CDT

## 2020-09-08 NOTE — PROGRESS NOTES
Progress Note    Date:9/8/2020       Room:0517/517-01  Patient Name:Andrea Mcconnell     YOB: 1945     Age:74 y.o. Subjective   Interval History Status: not changed. Patient seen postop day #2 status post right hip hemiarthroplasty. Continues to be confused and stepped out of ICU as he has been weaned off of Precedex to surgical floor, vitals stable. Case discussed with nursing staff will have incentive spirometry placed in the room. Cumulative hospital course: Patient was transferred to [facility] unit from [facility] due to right femoral neck fracture. Per patient states he had a period of passing out, attempted to answer the doorbell fell did not hit his head. CT head without contrast noted to be no acute cranial process. History of alcohol abuse, last known drink was 4:30 AM, consumes approximately half a pint of vodka.,  COVID-19 not detected, patient underwent right hip hemiarthroplasty 9/6/2020. Transition to ICU due to symptoms of alcoholic withdrawal, placed on Precedex, initiation of Librium/banana bag. Clinical picture improved vitals stable patient has been transferred out of ICU. Continue with Librium taper, PT/OT, hopeful for discharge within the next 48 hours to skilled nursing facility.     Review of Systems   Review of Systems   Unable to perform ROS: Acuity of condition       Medications   Scheduled Meds:    folic acid, thiamine, multi-vitamin with vitamin K infusion   Intravenous Daily    chlordiazePOXIDE  25 mg Oral 4x Daily    sodium chloride flush  10 mL Intravenous 2 times per day    sennosides-docusate sodium  1 tablet Oral BID    enoxaparin  40 mg Subcutaneous Daily    rosuvastatin  20 mg Oral Daily    ramipril  10 mg Oral Daily    NIFEdipine  30 mg Oral Daily    metoprolol succinate  50 mg Oral Daily    ARIPiprazole  2 mg Oral Daily    diphenhydrAMINE  50 mg Oral Nightly    escitalopram  20 mg Oral Daily     Continuous Infusions:  lactated ringers 100 mL/hr at 20     PRN Meds: hydrALAZINE, labetalol, cyclobenzaprine, diphenhydrAMINE, phenol, sodium chloride flush, magnesium hydroxide, oxyCODONE **OR** oxyCODONE, famotidine (PEPCID) injection, naloxone, acetaminophen **OR** acetaminophen, nitroGLYCERIN, HYDROmorphone, HYDROmorphone, HYDROmorphone, LORazepam **OR** LORazepam **OR** LORazepam **OR** LORazepam **OR** LORazepam **OR** LORazepam **OR** [DISCONTINUED] LORazepam **OR** [DISCONTINUED] LORazepam    Past History    Past Medical History:   has a past medical history of CAD (coronary artery disease), Hyperlipidemia, Hypertension, Myocardial infarction ([address] and Tobacco abuse. Social History:   reports that he quit smoking about 8 years ago. His smoking use included cigarettes. He has a 25.00 pack-year smoking history. His smokeless tobacco use includes chew. He reports current alcohol use. He reports that he does not use drugs. Family History:   Family History   Problem Relation Age of Onset    Heart Surgery Brother         stent placement, CABG    Other Brother         uses tobacco    Other Mother          MVA   Litzy Rodriguez Emphysema Father         former smoker, had worked in a \"stove plant\"    Other Son         paralyzed s/p MVA, then     No Known Problems Son     Drug Abuse Daughter        Physical Examination      Vitals:  BP (!) 158/96   Pulse 86   Temp 98.2 °F (36.8 °C) (Temporal)   Resp 18   Ht 6' 1\" (1.854 m)   Wt 184 lb 8 oz (83.7 kg)   SpO2 93%   BMI 24.34 kg/m²   Temp (24hrs), Av °F (36.7 °C), Min:96.9 °F (36.1 °C), Max:99 °F (37.2 °C)      I/O (24Hr): Intake/Output Summary (Last 24 hours) at 2020 1314  Last data filed at 2020 1000  Gross per 24 hour   Intake 2230.4 ml   Output 785 ml   Net 1445.4 ml       Physical Exam  Vitals signs and nursing note reviewed. Constitutional:       Appearance: He is not ill-appearing or toxic-appearing.       Comments: Fatigued appearing HENT:      Head: Normocephalic and atraumatic. Nose: Nose normal.   Eyes:      General:         Right eye: No discharge. Left eye: No discharge. Conjunctiva/sclera: Conjunctivae normal.   Cardiovascular:      Rate and Rhythm: Normal rate and regular rhythm. Pulmonary:      Comments: 2 L nasal cannula oxygen in place  Abdominal:      General: Bowel sounds are normal.      Tenderness: There is no abdominal tenderness. There is no guarding or rebound. Musculoskeletal:         General: Tenderness present. Right lower leg: No edema. Left lower leg: No edema. Comments: Postoperative incision/bandage right hip   Skin:     General: Skin is warm. Capillary Refill: Capillary refill takes less than 2 seconds. Neurological:      Comments: Confused, fidgety   Psychiatric:      Comments: Confused state         Labs/Imaging/Diagnostics   Labs:  CBC:  Recent Labs     09/06/20 0316 09/07/20 0340 09/08/20  0407   WBC 5.6 5.0 5.1   RBC 3.73* 2.67* 2.45*   HGB 12.5* 8.8* 8.3*   HCT 38.5* 26.7* 24.9*   .2* 100.0* 101.6*   RDW 12.6 12.6 12.8   * 84* 91*     CHEMISTRIES:  Recent Labs     09/06/20 0316 09/07/20 0340 09/08/20  0407   * 138 140   K 4.2 4.0 3.7   CL 90* 100 103   CO2 19* 25 25   BUN 11 14 12   CREATININE 0.7 0.6 0.5   GLUCOSE 80 173* 96     PT/INR:  Recent Labs     09/05/20 2031   PROTIME 13.7   INR 1.05     APTT:  Recent Labs     09/05/20 2031   APTT 27.6     LIVER PROFILE:  Recent Labs     09/05/20 2031   *   *   BILITOT 0.7   ALKPHOS 101       Imaging Last 24 Hours:  No results found.       Assessment        Hospital Problems           Last Modified POA    Closed comminuted intertrochanteric fracture of proximal end of right femur ([address] 9/5/2020 Yes    Alcohol dependence, continuous ([address] 9/5/2020 Yes    Alcohol withdrawal hallucinosis ([address] 9/5/2020 Yes          Plan:        Alcohol dependence/alcohol withdrawal  -Has been weaned off of Precedex, continue Librium taper stable out of ICU  -CIWA protocol in place, lorazepam as needed  -Banana bag  -Seizure precautions  -Thiamine/folate/multivitamin    Closed comminuted intertrochanteric fracture of the proximal end of the right femur  -Postop day #2 status post right hip hemiarthroplasty  -Preoperative H/H 12.5/38.5  -postoperative H/H 8.3/24.9  -COVID-19 screening not detected 9/6/2020  -Recommendation to scrutinize patient's respiratory status in the postoperative period, continue incentive spirometry efforts, PT/OT in the postoperative period, case management for safe discharge disposition  -Orthopedic surgery consulted and following appreciate further recs, anticoagulation per orthopedics -currently on Lovenox 40 mg    Dyslipidemia   - Controlled   - Continue Statin therapy   Lab Results   Component Value Date    CHOL 151 (L) 07/08/2019   ,   Lab Results   Component Value Date    HDL 60 07/08/2019     Lab Results   Component Value Date    LDLCALC 77 07/08/2019   ,   Lab Results   Component Value Date    TRIG 72 07/08/2019       Essential hypertension-chronic condition, continue with antihypertensive regimen, routine vitals  -Currently uncontrolled, PRN modalities have been placed    Depression/anxiety/mood disorder-chronic conditions, continue with Lexapro/Abilify    Thrombocytopenia-improved, continue to monitor daily CBC        Electronically signed by   Mandi Perrin   Internal Medicine Hospitalist  On 9/8/2020  At 1:14 PM    EMR Dragon/Transcription disclaimer:   Much of this encounter note is an electronic transcription/translation of spoken language to printed text.  The electronic translation of spoken language may permit erroneous, or at times, nonsensical words or phrases to be inadvertently transcribed; although attempts have made to review the note for such errors, some may still exist.

## 2020-09-08 NOTE — PROGRESS NOTES
Orthopedic Surgery Progress Note    Tamiko Ho  9/8/2020      Subjective:     Systemic or Specific Complaints:  Somewhat confused. Oriented to place. Expresses desire to [employer] out   Of here. \"  States hip pain is controlled. Objective:     Patient Vitals for the past 24 hrs:   BP Temp Temp src Pulse Resp SpO2 Weight   09/08/20 0601 (!) 153/89 -- -- 88 22 95 % --   09/08/20 0545 -- -- -- -- -- -- 184 lb 8 oz (83.7 kg)   09/08/20 0502 (!) 142/93 -- -- 90 22 92 % --   09/08/20 0400 136/81 -- -- 99 20 95 % --   09/08/20 0330 136/81 -- -- 87 24 92 % --   09/08/20 0300 134/87 97.9 °F (36.6 °C) Temporal 76 23 99 % --   09/08/20 0230 (!) 146/72 -- -- 79 23 96 % --   09/08/20 0200 (!) 140/92 -- -- 89 24 95 % --   09/08/20 0130 115/71 -- -- 71 18 97 % --   09/08/20 0100 123/74 -- -- 73 18 -- --   09/08/20 0030 124/85 -- -- 75 22 97 % --   09/08/20 0000 128/86 -- -- 76 23 94 % --   09/07/20 2330 120/72 -- -- 71 18 96 % --   09/07/20 2300 107/67 97.1 °F (36.2 °C) Temporal 74 19 97 % --   09/07/20 2230 132/88 -- -- 77 23 98 % --   09/07/20 2200 (!) 168/93 -- -- 73 20 94 % --   09/07/20 2130 133/67 -- -- 75 24 97 % --   09/07/20 2100 (!) 125/104 -- -- 77 23 99 % --   09/07/20 2030 128/79 -- -- 73 21 98 % --   09/07/20 2000 113/71 -- -- 73 22 99 % --   09/07/20 1930 112/70 96.9 °F (36.1 °C) Temporal 76 23 96 % --   09/07/20 1900 90/67 -- -- 75 24 100 % --   09/07/20 1800 122/70 -- -- 99 25 92 % --   09/07/20 1700 132/87 -- -- 101 25 92 % --   09/07/20 1600 120/73 98.8 °F (37.1 °C) -- 94 25 92 % --   09/07/20 1500 103/69 -- -- 79 22 96 % --   09/07/20 1416 -- -- -- -- 21 95 % --   09/07/20 1400 115/83 -- -- 97 17 (!) 83 % --   09/07/20 1300 117/68 -- -- 91 15 93 % --   09/07/20 1200 (!) 108/93 98.9 °F (37.2 °C) Temporal 91 20 91 % --       right lower  General: alert, appears stated age and cooperative   Wound: Covered.                Dressing: clean, dry, and intact   Extremity: Distal NVI           DVT Exam: No evidence of DVT seen on physical exam.                   Data Review:  Recent Labs     09/07/20  0340 09/08/20  0407   HGB 8.8* 8.3*     Recent Labs     09/08/20  0407      K 3.7   CREATININE 0.5       Assessment:     POD# 2, right hip mert    Plan:      1:  DVT prophylaxis, ICE, elevate  2:  Pain control  3:  Physical therapy/Occupational therapy  4:  Full weight bearing, with PHP, RLE.         Electronically signed by Rabia Mayen PA-C on 9/8/2020 at 8:01 AM

## 2020-09-08 NOTE — PROGRESS NOTES
Gertrude Whittington transferred to 517 from 142 via bed. Reason for transfer: Lower level of care   Explained reason for transfer to Patient. Belongings: None   Soft chart transferred with patient: Yes. Telemetry box number [id number] transferred with patient: yes. Pt HR 92, NSR  Report given to: Anabella Bowman, via telephone.       Electronically signed by Jose Roberto Christine RN on 9/8/2020 at 11:56 AM DISPLAY PLAN FREE TEXT

## 2020-09-08 NOTE — PROGRESS NOTES
Physical Therapy    Facility/Department: [facility] ICU  Initial Assessment    NAME: Alyx Rivera  : 1945  MRN: [id number]    Date of Service: 2020    Discharge Recommendations:  Continue to assess pending progress, Patient would benefit from continued therapy after discharge, 24 hour supervision or assist        Assessment   Body structures, Functions, Activity limitations: Decreased functional mobility ; Decreased ROM; Decreased strength;Decreased endurance;Decreased cognition;Decreased safe awareness;Decreased balance;Decreased coordination; Increased pain;Decreased posture  Assessment: Pt. will benefit from PT to decrease impairments. Pt. a fall risk and should not attempt mobility on his own at this time. Pt. needs to use spencer steady and gait belt with staff A for transfer to chair. Anticipate pt. will need additional therapy once d/c from [facility]. Pt. limited in participation due to desatting, decreased command follow and lethargy. Treatment Diagnosis: impaired gait and mobility  Prognosis: Guarded  Decision Making: Medium Complexity  PT Education: Goals;PT Role;Plan of Care;Gait Training;General Safety;Transfer Training;Functional Mobility Training; Adaptive Device Training;Weight-bearing Education  Patient Education: cont. PT per POC. Barriers to Learning: cognition  REQUIRES PT FOLLOW UP: Yes  Activity Tolerance  Activity Tolerance: Patient limited by fatigue;Patient limited by cognitive status       Patient Diagnosis(es): There were no encounter diagnoses. has a past medical history of CAD (coronary artery disease), Hyperlipidemia, Hypertension, Myocardial infarction [facility]), and Tobacco abuse.   has a past surgical history that includes Hernia repair; Coronary angioplasty with stent; Cardiac catheterization (); Cardiac catheterization (2016); Diagnostic Cardiac Cath Lab Procedure;  Tonsillectomy; and hip surgery (Right, Difficulty attending to directions  Memory: Decreased recall of precautions;Decreased recall of recent events  Safety Judgement: Decreased awareness of need for assistance  Problem Solving: Assistance required to generate solutions;Assistance required to implement solutions  Insights: Decreased awareness of deficits  Initiation: Requires cues for all  Sequencing: Requires cues for all  Cognition Comment: Lethargic, easily falls back to sleep, awakens and participates with alerting stim    Objective     Observation/Palpation  Posture: Poor  Observation: heavy lean to R side with sitting, posterior lean    AROM RLE (degrees)  RLE AROM: Exceptions  RLE General AROM: AAROM RLE hip and knee to 90, ankle WFLs AROM  AROM LLE (degrees)  LLE AROM : WFL  Strength RLE  Strength RLE: Exception  Comment: ankle 3/5, knee and hip 2/5  Strength LLE  Strength LLE: WNL  Comment: 3+/5        Bed mobility  Supine to Sit: Maximum assistance;2 Person assistance(using draw pad)  Sit to Supine: Maximum assistance;2 Person assistance  Comment: pt. sat on EOB x 10 mins+ with heavy R side lean, v. cues needed to reach and maintain midline  Transfers  Sit to Stand: Moderate Assistance;Maximum Assistance;2 Person Assistance  Stand to sit: Moderate Assistance;Maximum Assistance;2 Person Assistance  Comment: pt. performed 2 standing trials. Pt. used RW both times and could not achieve full standing on 1st attempt. Pt. stood a 2nd time and used RW to make 2 small steps to [facility]. Pt. unable to effectively WB RLE and knee was buckling. Pt. sat back on EOB and returned to supine with A. Pt. sats dropped to high 70s with mobility, and pt needed v. cues to purse lip breathe. Pt. not following cues well. RN present.   Ambulation  Ambulation?: No  WB Status: FWBAT RLE     Balance  Posture: Fair  Sitting - Static: Poor;+  Sitting - Dynamic: Poor;-  Standing - Static: Poor;-  Standing - Dynamic: Poor;-  Exercises  Knee Long Arc Quad: x 10 seated EOB  Ankle Pumps: x10 seated EOB     Plan   Plan  Times per week: 3-7  Times per day: Daily  Plan weeks: 2  Current Treatment Recommendations: Strengthening, ROM, Balance Training, Functional Mobility Training, Transfer Training, Endurance Training, Gait Training, Safety Education & Training, Positioning, Equipment Evaluation, Education, & procurement, Patient/Caregiver Education & Training  Plan Comment: cont. PT per POC. Safety Devices  Type of devices: Gait belt, Patient at risk for falls, Nurse notified, Call light within reach, Left in bed(scds back on pt after PT)    G-Code       OutComes Score                                                  AM-PAC Score             Goals  Short term goals  Time Frame for Short term goals: 2 wks  Short term goal 1: supine to sit indep  Short term goal 2: sit to stand indep  Short term goal 3: amb. 100' with RW SBA, FWCHASITYT RLE.   Patient Goals   Patient goals : go home       Therapy Time   Individual Concurrent Group Co-treatment   Time In           Time Out           Minutes                   Leslye Ormond, PT    Electronically signed by Leslye Ormond, PT on 9/8/2020 at 11:45 AM

## 2020-09-08 NOTE — PROGRESS NOTES
Occupational Therapy   Occupational Therapy Initial Assessment  Date: 2020   Patient Name: Tamiko Ho  MRN: [id number]     : 1945    Date of Service: 2020    Discharge Recommendations:  Patient would benefit from continued therapy after discharge       Assessment   Assessment: Evaluation completed and tx initiated. The patient will need further skilled therapy for ADL, safety,  and mobility. Likely to make significant gains with improved cognitive status. Treatment Diagnosis: Right femur fx s/p Right hip hemiarthroplasty, fall  History: Etoh rehab to be discussed  REQUIRES OT FOLLOW UP: Yes  Activity Tolerance  Activity Tolerance: Treatment limited secondary to decreased cognition  Safety Devices  Safety Devices in place: Not Applicable(nursing attending to patient)           Patient Diagnosis(es): There were no encounter diagnoses. has a past medical history of CAD (coronary artery disease), Hyperlipidemia, Hypertension, Myocardial infarction [facility]), and Tobacco abuse.   has a past surgical history that includes Hernia repair; Coronary angioplasty with stent; Cardiac catheterization (); Cardiac catheterization (2016); Diagnostic Cardiac Cath Lab Procedure; Tonsillectomy; and hip surgery (Right, 2020).     Treatment Diagnosis: Right femur fx s/p Right hip hemiarthroplasty, fall      Restrictions  Restrictions/Precautions  Restrictions/Precautions: Weight Bearing, Fall Risk, Seizure, Surgical Protocols  Required Braces or Orthoses?: Yes  Lower Extremity Weight Bearing Restrictions  Right Lower Extremity Weight Bearing: Weight Bearing As Tolerated  Required Braces or Orthoses  Right Lower Extremity Brace: Knee Immobilizer(while in bed)  Position Activity Restriction  Hip Precautions: Posterior hip precautions    Subjective   General  Chart Reviewed: Yes  Patient assessed for rehabilitation services?: Yes  Family / Caregiver Present: Yes(spouse)  Pain Assessment  Pain Assessment: 0-10  Pain Level: 0(denies pain)  Pain Type: Surgical pain  Vital Signs  Level of Consciousness: Alert  Social/Functional History  Social/Functional History  Lives With: Spouse  Type of Home: House  Home Layout: One level  Home Access: Level entry  Bathroom Shower/Tub: Tub/Shower unit  Bathroom Toilet: Standard  ADL Assistance: Independent  Homemaking Assistance: Needs assistance(has not assisted much for the last 3 months due to increased drinking)  Ambulation Assistance: Independent  Transfer Assistance: Independent  Active : Yes       Objective        Orientation  Overall Orientation Status: Within Functional Limits  Observation/Palpation  Posture: Poor  Observation: heavy lean to R side with sitting, posterior lean  Balance  Sitting Balance: Minimal assistance(to moderate assist)  Standing Balance: Maximum assistance(assist of two, knees buckling)  Toilet Transfers  Toilet Transfers Comments: Recommend [place] with assist of two  ADL  Feeding: Minimal assistance  Grooming: Minimal assistance  UE Bathing: Maximum assistance  LE Bathing: Maximum assistance  UE Dressing: Moderate assistance  LE Dressing: Maximum assistance        Bed mobility  Supine to Sit: Maximum assistance;2 Person assistance  Sit to Supine: Maximum assistance;2 Person assistance  Transfers  Transfer Comments: Based on EOB sitting, patient is likely a good candidate for [place] with assist of two     Cognition  Overall Cognitive Status: Exceptions  Following Commands: Follows one step commands with repetition; Follows one step commands with increased time  Attention Span: Difficulty attending to directions  Memory: Decreased recall of precautions;Decreased recall of recent events  Safety Judgement: Decreased awareness of need for assistance  Problem Solving: Assistance required to generate solutions;Assistance required to implement solutions  Insights: Decreased awareness of deficits  Initiation: Requires cues for all  Sequencing: Requires cues for all  Cognition Comment: Lethargic, easily falls back to sleep, awakens and participates with alerting stim. Needs full structure and supervision throughout. LUE PROM (degrees)  LUE PROM: WFL  LUE AROM (degrees)  LUE AROM : WFL  RUE PROM (degrees)  RUE PROM: WFL  RUE AROM (degrees)  RUE AROM : WFL                    Tx initiated:  Posterior hip precautions, movement strategies.     Plan   Plan  Times per week: 4-8    G-Code     OutComes Score                                                  AM-PAC Score             Goals  Short term goals  Short term goal 1: Peform dressing with min A  Short term goal 2: Perform toileting with min A  Short term goal 3: Perform transfers with min A  Short term goal 4: Verbalize posterior hip precautions  Short term goal 5: Perform light ambulatory ADL with min a  Long term goals  Long term goal 1: Upgrade as tolerated       Therapy Time   Individual Concurrent Group Co-treatment   Time In           Time Out           Minutes                   Bela Malloy OT Electronically signed by Bela Malloy OT on 9/8/2020 at 2:01 PM

## 2020-09-09 LAB
ANION GAP SERPL CALCULATED.3IONS-SCNC: 10 MMOL/L (ref 7–19)
BASOPHILS ABSOLUTE: 0 K/UL (ref 0–0.2)
BASOPHILS RELATIVE PERCENT: 0.4 % (ref 0–1)
BUN BLDV-MCNC: 9 MG/DL (ref 8–23)
CALCIUM SERPL-MCNC: 9.1 MG/DL (ref 8.8–10.2)
CHLORIDE BLD-SCNC: 103 MMOL/L (ref 98–111)
CO2: 25 MMOL/L (ref 22–29)
CREAT SERPL-MCNC: 0.4 MG/DL (ref 0.5–1.2)
EOSINOPHILS ABSOLUTE: 0.1 K/UL (ref 0–0.6)
EOSINOPHILS RELATIVE PERCENT: 2.3 % (ref 0–5)
GFR AFRICAN AMERICAN: >59
GFR NON-AFRICAN AMERICAN: >60
GLUCOSE BLD-MCNC: 96 MG/DL (ref 74–109)
HCT VFR BLD CALC: 24.8 % (ref 42–52)
HEMOGLOBIN: 8.2 G/DL (ref 14–18)
IMMATURE GRANULOCYTES #: 0 K/UL
LYMPHOCYTES ABSOLUTE: 0.8 K/UL (ref 1.1–4.5)
LYMPHOCYTES RELATIVE PERCENT: 16 % (ref 20–40)
MCH RBC QN AUTO: 33.6 PG (ref 27–31)
MCHC RBC AUTO-ENTMCNC: 33.1 G/DL (ref 33–37)
MCV RBC AUTO: 101.6 FL (ref 80–94)
MONOCYTES ABSOLUTE: 0.9 K/UL (ref 0–0.9)
MONOCYTES RELATIVE PERCENT: 18.5 % (ref 0–10)
NEUTROPHILS ABSOLUTE: 3 K/UL (ref 1.5–7.5)
NEUTROPHILS RELATIVE PERCENT: 62.2 % (ref 50–65)
PDW BLD-RTO: 12.6 % (ref 11.5–14.5)
PLATELET # BLD: 103 K/UL (ref 130–400)
PMV BLD AUTO: 9 FL (ref 9.4–12.4)
POTASSIUM REFLEX MAGNESIUM: 3.6 MMOL/L (ref 3.5–5)
RBC # BLD: 2.44 M/UL (ref 4.7–6.1)
SODIUM BLD-SCNC: 138 MMOL/L (ref 136–145)
WBC # BLD: 4.8 K/UL (ref 4.8–10.8)

## 2020-09-09 PROCEDURE — 6360000002 HC RX W HCPCS: Performed by: FAMILY MEDICINE

## 2020-09-09 PROCEDURE — 6370000000 HC RX 637 (ALT 250 FOR IP): Performed by: FAMILY MEDICINE

## 2020-09-09 PROCEDURE — 2580000003 HC RX 258: Performed by: FAMILY MEDICINE

## 2020-09-09 PROCEDURE — 85025 COMPLETE CBC W/AUTO DIFF WBC: CPT

## 2020-09-09 PROCEDURE — 80048 BASIC METABOLIC PNL TOTAL CA: CPT

## 2020-09-09 PROCEDURE — 97530 THERAPEUTIC ACTIVITIES: CPT

## 2020-09-09 PROCEDURE — 2700000000 HC OXYGEN THERAPY PER DAY

## 2020-09-09 PROCEDURE — 36415 COLL VENOUS BLD VENIPUNCTURE: CPT

## 2020-09-09 PROCEDURE — 94640 AIRWAY INHALATION TREATMENT: CPT

## 2020-09-09 PROCEDURE — 1210000000 HC MED SURG R&B

## 2020-09-09 PROCEDURE — 2500000003 HC RX 250 WO HCPCS: Performed by: FAMILY MEDICINE

## 2020-09-09 RX ORDER — CHLORDIAZEPOXIDE HYDROCHLORIDE 25 MG/1
25 CAPSULE, GELATIN COATED ORAL 3 TIMES DAILY
Status: DISCONTINUED | OUTPATIENT
Start: 2020-09-09 | End: 2020-09-10

## 2020-09-09 RX ORDER — IPRATROPIUM BROMIDE AND ALBUTEROL SULFATE 2.5; .5 MG/3ML; MG/3ML
1 SOLUTION RESPIRATORY (INHALATION)
Status: DISCONTINUED | OUTPATIENT
Start: 2020-09-09 | End: 2020-09-11 | Stop reason: HOSPADM

## 2020-09-09 RX ADMIN — ESCITALOPRAM OXALATE 20 MG: 10 TABLET ORAL at 10:49

## 2020-09-09 RX ADMIN — IPRATROPIUM BROMIDE AND ALBUTEROL SULFATE 1 AMPULE: .5; 3 SOLUTION RESPIRATORY (INHALATION) at 15:17

## 2020-09-09 RX ADMIN — IPRATROPIUM BROMIDE AND ALBUTEROL SULFATE 1 AMPULE: .5; 3 SOLUTION RESPIRATORY (INHALATION) at 19:23

## 2020-09-09 RX ADMIN — CHLORDIAZEPOXIDE HYDROCHLORIDE 25 MG: 25 CAPSULE ORAL at 10:49

## 2020-09-09 RX ADMIN — NIFEDIPINE 30 MG: 30 TABLET, FILM COATED, EXTENDED RELEASE ORAL at 10:49

## 2020-09-09 RX ADMIN — METOPROLOL SUCCINATE 50 MG: 50 TABLET, EXTENDED RELEASE ORAL at 10:49

## 2020-09-09 RX ADMIN — ARIPIPRAZOLE 2 MG: 2 TABLET ORAL at 10:49

## 2020-09-09 RX ADMIN — DIPHENHYDRAMINE HCL 50 MG: 25 TABLET ORAL at 20:32

## 2020-09-09 RX ADMIN — SODIUM CHLORIDE, PRESERVATIVE FREE 10 ML: 5 INJECTION INTRAVENOUS at 20:31

## 2020-09-09 RX ADMIN — RAMIPRIL 10 MG: 10 CAPSULE ORAL at 10:49

## 2020-09-09 RX ADMIN — DOCUSATE SODIUM 50MG AND SENNOSIDES 8.6MG 1 TABLET: 8.6; 5 TABLET, FILM COATED ORAL at 20:32

## 2020-09-09 RX ADMIN — SODIUM CHLORIDE, SODIUM LACTATE, POTASSIUM CHLORIDE, AND CALCIUM CHLORIDE: 600; 310; 30; 20 INJECTION, SOLUTION INTRAVENOUS at 13:37

## 2020-09-09 RX ADMIN — DOCUSATE SODIUM 50MG AND SENNOSIDES 8.6MG 1 TABLET: 8.6; 5 TABLET, FILM COATED ORAL at 10:49

## 2020-09-09 RX ADMIN — CHLORDIAZEPOXIDE HYDROCHLORIDE 25 MG: 25 CAPSULE ORAL at 15:22

## 2020-09-09 RX ADMIN — FOLIC ACID: 5 INJECTION, SOLUTION INTRAMUSCULAR; INTRAVENOUS; SUBCUTANEOUS at 10:50

## 2020-09-09 RX ADMIN — ROSUVASTATIN CALCIUM 20 MG: 20 TABLET, FILM COATED ORAL at 10:49

## 2020-09-09 RX ADMIN — CHLORDIAZEPOXIDE HYDROCHLORIDE 25 MG: 25 CAPSULE ORAL at 20:32

## 2020-09-09 RX ADMIN — ENOXAPARIN SODIUM 40 MG: 40 INJECTION SUBCUTANEOUS at 10:49

## 2020-09-09 ASSESSMENT — PAIN SCALES - GENERAL
PAINLEVEL_OUTOF10: 0
PAINLEVEL_OUTOF10: 0

## 2020-09-09 NOTE — PROGRESS NOTES
Physical Therapy  Name: Kai Lee  MRN:  [id number]  Date of service:  9/9/2020 09/09/20 1153   Subjective   Subjective Pt agreeable to working with therapy, states he wants to try and walk. Bed Mobility   Rolling Moderate assistance  (x2, pillows between knees to protect post hip precaustions)   Supine to Sit Maximal assistance  (x2)   Scooting Maximal assistance;2 Person assistance   Transfers   Sit to Stand Moderate Assistance;Maximum Assistance;2 Person Assistance   Stand to sit Moderate Assistance;2 Person Assistance   Lateral Transfers Moderate Assistance;2 Person Assistance   Comment pt transferred to recliner via [address] stedy. pt stood to rw with mod to max x2   Ambulation   Ambulation? No   WB Status FWBAT RLE   Short term goals   Time Frame for Short term goals 2 wks   Short term goal 1 supine to sit indep   Short term goal 2 sit to stand indep   Short term goal 3 amb. 100' with RW SBA, FWBAT RLE. Conditions Requiring Skilled Therapeutic Intervention   Body structures, Functions, Activity limitations Decreased functional mobility ; Decreased ROM; Decreased strength;Decreased endurance;Decreased cognition;Decreased safe awareness;Decreased balance;Decreased coordination; Increased pain;Decreased posture   Assessment Pt unable to take any steps with RW. Pt transferred to recliner via [address] stedy.     Activity Tolerance   Activity Tolerance Patient limited by fatigue;Patient limited by cognitive status   Safety Devices   Type of devices Left in chair;Call light within reach  (spouse present)     Electronically signed by Anne Dumont PTA on 9/9/2020 at 12:02 PM

## 2020-09-09 NOTE — CARE COORDINATION
SW met with Pt/spouse regarding DC planning options. Preference is for Pt to DC home with family support and do outpatient therapy. Pt/spouse also agreed to [facility] as a back up options. SW will reach out to the facility and see if they are able to accept.    [facility] Swing Bed   L   F  Electronically signed by Jeanine Warren on 9/9/2020 at 9:57 AM

## 2020-09-09 NOTE — PROGRESS NOTES
Progress Note    Date:9/9/2020       Room:0517/517-01  Patient Name:Andrea Chavez     YOB: 1945     Age:74 y.o. Subjective   Interval History Status: not changed. Patient seen postop day #3 status post right hip hemiarthroplasty. Continues to be confused and stepped out of ICU as he has been weaned off of Precedex to surgical floor, vitals stable. Case discussed with nursing staff will have incentive spirometry placed in the room. Cumulative hospital course: Patient was transferred to [facility] surgical unit from [facility] due to right femoral neck fracture. Per patient states he had a period of passing out, attempted to answer the doorbell fell did not hit his head. CT head without contrast noted to be no acute cranial process. History of alcohol abuse, last known drink was 4:30 AM, consumes approximately half a pint of vodka.,  COVID-19 not detected, patient underwent right hip hemiarthroplasty 9/6/2020. Transition to ICU due to symptoms of alcoholic withdrawal, placed on Precedex, initiation of Librium/banana bag. Clinical picture improved vitals stable patient has been transferred out of ICU.   Continue with Librium taper, PT/OT, hopeful for discharge within the next 48 hours, family deciding for [facility] swing bed versus home with PT    Review of Systems   Review of Systems   Unable to perform ROS: Acuity of condition (Slight improvement seen, patient waxing and waning with conversation)       Medications   Scheduled Meds:    chlordiazePOXIDE  25 mg Oral TID    folic acid, thiamine, multi-vitamin with vitamin K infusion   Intravenous Daily    sodium chloride flush  10 mL Intravenous 2 times per day    sennosides-docusate sodium  1 tablet Oral BID    enoxaparin  40 mg Subcutaneous Daily    rosuvastatin  20 mg Oral Daily    ramipril  10 mg Oral Daily    NIFEdipine  30 mg Oral Daily    metoprolol succinate  50 mg Oral Daily    ARIPiprazole  2 mg Oral Daily  diphenhydrAMINE  50 mg Oral Nightly    escitalopram  20 mg Oral Daily     Continuous Infusions:    lactated ringers 100 mL/hr at 20     PRN Meds: hydrALAZINE, labetalol, cyclobenzaprine, diphenhydrAMINE, phenol, sodium chloride flush, magnesium hydroxide, oxyCODONE **OR** oxyCODONE, famotidine (PEPCID) injection, naloxone, acetaminophen **OR** acetaminophen, nitroGLYCERIN, HYDROmorphone, LORazepam **OR** LORazepam **OR** LORazepam **OR** LORazepam **OR** LORazepam **OR** LORazepam **OR** [DISCONTINUED] LORazepam **OR** [DISCONTINUED] LORazepam    Past History    Past Medical History:   has a past medical history of CAD (coronary artery disease), Hyperlipidemia, Hypertension, Myocardial infarction ([address] and Tobacco abuse. Social History:   reports that he quit smoking about 8 years ago. His smoking use included cigarettes. He has a 25.00 pack-year smoking history. His smokeless tobacco use includes chew. He reports current alcohol use. He reports that he does not use drugs. Family History:   Family History   Problem Relation Age of Onset    Heart Surgery Brother         stent placement, CABG    Other Brother         uses tobacco    Other Mother          MVA   Tam Can Emphysema Father         former smoker, had worked in a \"stove plant\"    Other Son         paralyzed s/p MVA, then    Tam Can No Known Problems Son     Drug Abuse Daughter        Physical Examination      Vitals:  BP (!) 142/94   Pulse 82   Temp 97.5 °F (36.4 °C) (Temporal)   Resp 18   Ht 6' 1\" (1.854 m)   Wt 184 lb 8 oz (83.7 kg)   SpO2 92%   BMI 24.34 kg/m²   Temp (24hrs), Av.1 °F (36.7 °C), Min:97.4 °F (36.3 °C), Max:98.9 °F (37.2 °C)      I/O (24Hr): Intake/Output Summary (Last 24 hours) at 2020 1035  Last data filed at 2020 0618  Gross per 24 hour   Intake 2864.97 ml   Output 50 ml   Net 2814.97 ml       Physical Exam  Vitals signs and nursing note reviewed.    Constitutional:       Appearance: He is not ill-appearing or toxic-appearing. Comments: Fatigued appearing   HENT:      Head: Normocephalic and atraumatic. Nose: Nose normal.   Eyes:      General:         Right eye: No discharge. Left eye: No discharge. Conjunctiva/sclera: Conjunctivae normal.   Cardiovascular:      Rate and Rhythm: Normal rate and regular rhythm. Pulmonary:      Comments: 2 L nasal cannula oxygen in place  Abdominal:      General: Bowel sounds are normal.      Tenderness: There is no abdominal tenderness. There is no guarding or rebound. Musculoskeletal:         General: Tenderness present. Right lower leg: No edema. Left lower leg: No edema. Comments: Postoperative incision/bandage right hip    Diminished range of motion right lower extremity when compared to that of the left   Skin:     General: Skin is warm. Capillary Refill: Capillary refill takes less than 2 seconds. Neurological:      Comments: Less tremor/movement seen this a.m., no withdrawal symptoms noted    Oriented to person and place   Psychiatric:      Comments: Confused state         Labs/Imaging/Diagnostics   Labs:  CBC:  Recent Labs     09/07/20 0340 09/08/20 0407 09/09/20  0156   WBC 5.0 5.1 4.8   RBC 2.67* 2.45* 2.44*   HGB 8.8* 8.3* 8.2*   HCT 26.7* 24.9* 24.8*   .0* 101.6* 101.6*   RDW 12.6 12.8 12.6   PLT 84* 91* 103*     CHEMISTRIES:  Recent Labs     09/07/20 0340 09/08/20  0407 09/09/20  0156    140 138   K 4.0 3.7 3.6    103 103   CO2 25 25 25   BUN 14 12 9   CREATININE 0.6 0.5 0.4*   GLUCOSE 173* 96 96     PT/INR:  No results for input(s): PROTIME, INR in the last 72 hours. APTT:  No results for input(s): APTT in the last 72 hours. LIVER PROFILE:  No results for input(s): AST, ALT, BILIDIR, BILITOT, ALKPHOS in the last 72 hours. Imaging Last 24 Hours:  No results found.       Assessment        Hospital Problems           Last Modified POA    Closed comminuted intertrochanteric fracture of proximal end of right femur ([facility] 9/5/2020 Yes    Alcohol dependence, continuous ([facility] 9/5/2020 Yes    Alcohol withdrawal hallucinosis ([facility] [address] 9/5/2020 Yes          Plan:        Alcohol dependence/alcohol withdrawal  -Patient no longer on Precedex, Librium taper decreased to 3 times daily. -CIWA protocol in place, lorazepam as needed  -Banana bag  -Seizure precautions  -Thiamine/folate/multivitamin    Closed comminuted intertrochanteric fracture of the proximal end of the right femur  -Postop day #3 status post right hip hemiarthroplasty  -Preoperative H/H 12.5/38.5  -postoperative H/H 8.2/24.8  -COVID-19 screening not detected 9/6/2020  -Recommendation to scrutinize patient's respiratory status in the postoperative period, continue incentive spirometry efforts, PT/OT in the postoperative period, case management for safe discharge disposition -family requesting possible [facility] swing bed placement versus home with PT/OT  -Orthopedic surgery consulted and following appreciate further recs, anticoagulation per orthopedics -currently on Lovenox 40 mg    Dyslipidemia   - Controlled   - Continue Statin therapy   Lab Results   Component Value Date    CHOL 151 (L) 07/08/2019   ,   Lab Results   Component Value Date    HDL 60 07/08/2019     Lab Results   Component Value Date    LDLCALC 77 07/08/2019   ,   Lab Results   Component Value Date    TRIG 72 07/08/2019       Essential hypertension-chronic condition, continue with antihypertensive regimen, routine vitals  -Currently uncontrolled, PRN modalities have been placed    Depression/anxiety/mood disorder-chronic conditions, continue with Lexapro/Abilify    Thrombocytopenia-improved, continue to monitor daily CBC        Electronically signed by   Imelda Whitten   Internal Medicine Hospitalist  On 9/9/2020  At 10:35 AM    EMR Dragon/Transcription disclaimer:   Much of this encounter note is an electronic transcription/translation of spoken language to printed text.  The

## 2020-09-09 NOTE — PROGRESS NOTES
ADL  UE Bathing: Moderate assistance;Maximum assistance  LE Bathing: Maximum assistance  UE Dressing: Moderate assistance  LE Dressing: Maximum assistance  Toileting: Maximum assistance        Balance  Sitting Balance: Contact guard assistance(to MIN A, still leans to right but more responsive to cues and overall improved since yesterday)  Standing Balance: Moderate assistance(of two for safety)  Toilet Transfers  Toilet Transfers Comments: Recommend Antonia Thomas with assist of two  Bed mobility  Supine to Sit: Maximum assistance;2 Person assistance  Transfers  Transfer Comments: Legs buckle when attempting to take a step, used Antonia Thomas to successfully complete transfer with moderate assist of two, likely to need a significant boost from the lower seat height of the recliner when returning to bed                       Cognition  Cognition Comment: Still with some lethargy but awake and followed simple commands. Easily engaged in therapeutic activity. Requires supervision and structure. Reviewed posterior hip precautions.            Positioning  Adaptations: Up in recliner, pillows positioned to keeps legs from adducting, heels off loaded        Type of ROM/Therapeutic Exercise  Comment: Participated in bilateral UE AROM in supported sitting                    Plan   Plan  Times per week: 4-8  G-Code     OutComes Score                                                  AM-PAC Score             Goals  Short term goals  Short term goal 1: Peform dressing with min A  Short term goal 2: Perform toileting with min A  Short term goal 3: Perform transfers with min A  Short term goal 4: Verbalize posterior hip precautions  Short term goal 5: Perform light ambulatory ADL with min a  Long term goals  Long term goal 1: Upgrade as tolerated       Therapy Time   Individual Concurrent Group Co-treatment   Time In 1105         Time Out 1200         Minutes [address] Megan Painting OT Electronically signed by Kalia Berg Corinne Kent on 9/9/2020 at 12:54 PM

## 2020-09-09 NOTE — PROGRESS NOTES
Orthopedic Surgery Progress Note    Delos Hodgkins  9/9/2020      Subjective:     Systemic or Specific Complaints:  Wife at bedside. Says he slept ok. C/o pain only when moved. Objective:     Patient Vitals for the past 24 hrs:   BP Temp Temp src Pulse Resp SpO2   09/09/20 0610 (!) 151/89 97.4 °F (36.3 °C) Temporal 75 16 92 %   09/09/20 0223 (!) 150/90 97.6 °F (36.4 °C) Temporal 81 19 92 %   09/08/20 2322 (!) 153/93 -- -- 85 -- --   09/08/20 2322 (!) 167/91 98.7 °F (37.1 °C) Temporal 86 17 91 %   09/08/20 1926 109/68 98.9 °F (37.2 °C) Temporal 96 17 91 %   09/08/20 1418 (!) 152/91 98.5 °F (36.9 °C) Temporal 96 16 92 %   09/08/20 1149 (!) 158/96 98.2 °F (36.8 °C) Temporal 86 18 93 %   09/08/20 1100 (!) 145/84 -- -- 93 22 92 %   09/08/20 1000 (!) 120/90 -- -- 112 26 (!) 77 %   09/08/20 0900 (!) 161/89 -- -- 103 23 92 %   09/08/20 0828 (!) 148/98 99 °F (37.2 °C) -- 100 22 94 %       right lower  General: alert, appears stated age and cooperative   Wound: Clean, dry and intact. Dressing: clean, dry, and intact   Extremity: Distal NVI           DVT Exam: No evidence of DVT seen on physical exam.                   Data Review:  Recent Labs     09/08/20  0407 09/09/20  0156   HGB 8.3* 8.2*     Recent Labs     09/09/20  0156      K 3.6   CREATININE 0.4*       Assessment:     POD# 3, right hip mert    Plan:      1:  DVT prophylaxis, ICE, elevate  2:  Pain control  3:  Physical therapy/Occupational therapy  4:  Full weight bearing, with PHP, RLE.         Electronically signed by Morgan Ramírez PA-C on 9/9/2020 at 7:54 AM  Orthopedic Surgery Progress Note

## 2020-09-10 LAB
ANION GAP SERPL CALCULATED.3IONS-SCNC: 9 MMOL/L (ref 7–19)
BASOPHILS ABSOLUTE: 0 K/UL (ref 0–0.2)
BASOPHILS RELATIVE PERCENT: 0.2 % (ref 0–1)
BUN BLDV-MCNC: 9 MG/DL (ref 8–23)
CALCIUM SERPL-MCNC: 8.8 MG/DL (ref 8.8–10.2)
CHLORIDE BLD-SCNC: 103 MMOL/L (ref 98–111)
CO2: 24 MMOL/L (ref 22–29)
CREAT SERPL-MCNC: 0.4 MG/DL (ref 0.5–1.2)
EOSINOPHILS ABSOLUTE: 0.1 K/UL (ref 0–0.6)
EOSINOPHILS RELATIVE PERCENT: 2.6 % (ref 0–5)
GFR AFRICAN AMERICAN: >59
GFR NON-AFRICAN AMERICAN: >60
GLUCOSE BLD-MCNC: 99 MG/DL (ref 74–109)
HCT VFR BLD CALC: 24.7 % (ref 42–52)
HEMOGLOBIN: 8.1 G/DL (ref 14–18)
IMMATURE GRANULOCYTES #: 0 K/UL
LYMPHOCYTES ABSOLUTE: 0.9 K/UL (ref 1.1–4.5)
LYMPHOCYTES RELATIVE PERCENT: 19 % (ref 20–40)
MAGNESIUM: 1.8 MG/DL (ref 1.6–2.4)
MCH RBC QN AUTO: 33.3 PG (ref 27–31)
MCHC RBC AUTO-ENTMCNC: 32.8 G/DL (ref 33–37)
MCV RBC AUTO: 101.6 FL (ref 80–94)
MONOCYTES ABSOLUTE: 0.8 K/UL (ref 0–0.9)
MONOCYTES RELATIVE PERCENT: 18.1 % (ref 0–10)
NEUTROPHILS ABSOLUTE: 2.8 K/UL (ref 1.5–7.5)
NEUTROPHILS RELATIVE PERCENT: 59.7 % (ref 50–65)
PDW BLD-RTO: 13 % (ref 11.5–14.5)
PLATELET # BLD: 109 K/UL (ref 130–400)
PMV BLD AUTO: 9 FL (ref 9.4–12.4)
POTASSIUM REFLEX MAGNESIUM: 3.2 MMOL/L (ref 3.5–5)
RBC # BLD: 2.43 M/UL (ref 4.7–6.1)
SODIUM BLD-SCNC: 136 MMOL/L (ref 136–145)
WBC # BLD: 4.6 K/UL (ref 4.8–10.8)

## 2020-09-10 PROCEDURE — 6370000000 HC RX 637 (ALT 250 FOR IP): Performed by: FAMILY MEDICINE

## 2020-09-10 PROCEDURE — 1210000000 HC MED SURG R&B

## 2020-09-10 PROCEDURE — 36415 COLL VENOUS BLD VENIPUNCTURE: CPT

## 2020-09-10 PROCEDURE — 97530 THERAPEUTIC ACTIVITIES: CPT

## 2020-09-10 PROCEDURE — 80048 BASIC METABOLIC PNL TOTAL CA: CPT

## 2020-09-10 PROCEDURE — 94640 AIRWAY INHALATION TREATMENT: CPT

## 2020-09-10 PROCEDURE — 2580000003 HC RX 258: Performed by: FAMILY MEDICINE

## 2020-09-10 PROCEDURE — 6360000002 HC RX W HCPCS: Performed by: FAMILY MEDICINE

## 2020-09-10 PROCEDURE — 97535 SELF CARE MNGMENT TRAINING: CPT

## 2020-09-10 PROCEDURE — 2700000000 HC OXYGEN THERAPY PER DAY

## 2020-09-10 PROCEDURE — 83735 ASSAY OF MAGNESIUM: CPT

## 2020-09-10 PROCEDURE — 97116 GAIT TRAINING THERAPY: CPT

## 2020-09-10 PROCEDURE — 85025 COMPLETE CBC W/AUTO DIFF WBC: CPT

## 2020-09-10 PROCEDURE — 6370000000 HC RX 637 (ALT 250 FOR IP): Performed by: HOSPITALIST

## 2020-09-10 RX ORDER — POTASSIUM CHLORIDE 7.45 MG/ML
10 INJECTION INTRAVENOUS PRN
Status: DISCONTINUED | OUTPATIENT
Start: 2020-09-10 | End: 2020-09-11 | Stop reason: HOSPADM

## 2020-09-10 RX ORDER — BISACODYL 10 MG
10 SUPPOSITORY, RECTAL RECTAL ONCE
Status: DISCONTINUED | OUTPATIENT
Start: 2020-09-10 | End: 2020-09-11 | Stop reason: HOSPADM

## 2020-09-10 RX ORDER — POLYETHYLENE GLYCOL 3350 17 G/17G
17 POWDER, FOR SOLUTION ORAL 2 TIMES DAILY
Status: DISCONTINUED | OUTPATIENT
Start: 2020-09-10 | End: 2020-09-11 | Stop reason: HOSPADM

## 2020-09-10 RX ORDER — MAGNESIUM SULFATE 1 G/100ML
1 INJECTION INTRAVENOUS PRN
Status: DISCONTINUED | OUTPATIENT
Start: 2020-09-10 | End: 2020-09-11 | Stop reason: HOSPADM

## 2020-09-10 RX ORDER — CHLORDIAZEPOXIDE HYDROCHLORIDE 25 MG/1
25 CAPSULE, GELATIN COATED ORAL 2 TIMES DAILY
Status: DISCONTINUED | OUTPATIENT
Start: 2020-09-10 | End: 2020-09-11 | Stop reason: HOSPADM

## 2020-09-10 RX ORDER — POTASSIUM CHLORIDE 20 MEQ/1
40 TABLET, EXTENDED RELEASE ORAL PRN
Status: DISCONTINUED | OUTPATIENT
Start: 2020-09-10 | End: 2020-09-11 | Stop reason: HOSPADM

## 2020-09-10 RX ADMIN — CHLORDIAZEPOXIDE HYDROCHLORIDE 25 MG: 25 CAPSULE ORAL at 08:49

## 2020-09-10 RX ADMIN — POLYETHYLENE GLYCOL 3350 17 G: 17 POWDER, FOR SOLUTION ORAL at 08:48

## 2020-09-10 RX ADMIN — DOCUSATE SODIUM 50MG AND SENNOSIDES 8.6MG 1 TABLET: 8.6; 5 TABLET, FILM COATED ORAL at 21:16

## 2020-09-10 RX ADMIN — IPRATROPIUM BROMIDE AND ALBUTEROL SULFATE 1 AMPULE: .5; 3 SOLUTION RESPIRATORY (INHALATION) at 11:45

## 2020-09-10 RX ADMIN — NIFEDIPINE 30 MG: 30 TABLET, FILM COATED, EXTENDED RELEASE ORAL at 08:48

## 2020-09-10 RX ADMIN — ARIPIPRAZOLE 2 MG: 2 TABLET ORAL at 08:48

## 2020-09-10 RX ADMIN — DOCUSATE SODIUM 50MG AND SENNOSIDES 8.6MG 1 TABLET: 8.6; 5 TABLET, FILM COATED ORAL at 08:49

## 2020-09-10 RX ADMIN — ENOXAPARIN SODIUM 40 MG: 40 INJECTION SUBCUTANEOUS at 08:48

## 2020-09-10 RX ADMIN — POTASSIUM CHLORIDE 40 MEQ: 1500 TABLET, EXTENDED RELEASE ORAL at 08:49

## 2020-09-10 RX ADMIN — SODIUM CHLORIDE, SODIUM LACTATE, POTASSIUM CHLORIDE, AND CALCIUM CHLORIDE: 600; 310; 30; 20 INJECTION, SOLUTION INTRAVENOUS at 08:55

## 2020-09-10 RX ADMIN — RAMIPRIL 10 MG: 10 CAPSULE ORAL at 08:48

## 2020-09-10 RX ADMIN — METOPROLOL SUCCINATE 50 MG: 50 TABLET, EXTENDED RELEASE ORAL at 08:48

## 2020-09-10 RX ADMIN — IPRATROPIUM BROMIDE AND ALBUTEROL SULFATE 1 AMPULE: .5; 3 SOLUTION RESPIRATORY (INHALATION) at 19:55

## 2020-09-10 RX ADMIN — ROSUVASTATIN CALCIUM 20 MG: 20 TABLET, FILM COATED ORAL at 08:48

## 2020-09-10 RX ADMIN — IPRATROPIUM BROMIDE AND ALBUTEROL SULFATE 1 AMPULE: .5; 3 SOLUTION RESPIRATORY (INHALATION) at 14:49

## 2020-09-10 RX ADMIN — CHLORDIAZEPOXIDE HYDROCHLORIDE 25 MG: 25 CAPSULE ORAL at 21:16

## 2020-09-10 RX ADMIN — ESCITALOPRAM OXALATE 20 MG: 10 TABLET ORAL at 08:48

## 2020-09-10 ASSESSMENT — PAIN SCALES - GENERAL: PAINLEVEL_OUTOF10: 0

## 2020-09-10 NOTE — PROGRESS NOTES
Physical Therapy  Semaj Yuebobby  [id number]     09/10/20 1127   Subjective   Subjective Agrees to work with therapy. Bed Mobility   Supine to Sit Moderate assistance  (x2)   Transfers   Sit to Stand Moderate Assistance;Maximum Assistance;2 Person Assistance   Stand to sit Moderate Assistance;Maximum Assistance;2 Person Assistance   Ambulation   Ambulation? Yes   WB Status FWBAT RLE   Ambulation 1   Surface level tile   Assistance Moderate assistance;2 Person assistance   Distance 2'   Comments patient transferred step pivot from bed to recliner   Other Activities   Comment Worked with patient on bed mobility and transfers. Patient able to maintain sitting balance unassisted sitting EOB this treatment. Patient able to stand and take steps with RW from bed to chair. Patient then transferred to [facility] via chair and transferred chair to toilet via stand pivot. Patient then required assist for clean up and transferred toilet back to recliner. Patient positioned for comfort with all needs in reach and family present. Short term goals   Time Frame for Short term goals 2 wks   Short term goal 1 supine to sit indep   Short term goal 2 sit to stand indep   Short term goal 3 amb. 100' with RW SBA, FWBAT RLE. Activity Tolerance   Activity Tolerance Patient limited by endurance   Safety Devices   Type of devices Call light within reach; Chair alarm in place; Left in chair   Electronically signed by Geraldine Berman PTA on 9/10/2020 at 11:35 AM

## 2020-09-10 NOTE — PROGRESS NOTES
ramipril  10 mg Oral Daily    NIFEdipine  30 mg Oral Daily    metoprolol succinate  50 mg Oral Daily    ARIPiprazole  2 mg Oral Daily    diphenhydrAMINE  50 mg Oral Nightly    escitalopram  20 mg Oral Daily     Continuous Infusions:    lactated ringers 100 mL/hr at 09/10/20 0855     PRN Meds: magnesium sulfate, potassium chloride **OR** potassium alternative oral replacement **OR** potassium chloride, hydrALAZINE, labetalol, cyclobenzaprine, diphenhydrAMINE, phenol, sodium chloride flush, magnesium hydroxide, oxyCODONE **OR** oxyCODONE, famotidine (PEPCID) injection, naloxone, acetaminophen **OR** acetaminophen, nitroGLYCERIN, HYDROmorphone, LORazepam **OR** LORazepam **OR** LORazepam **OR** LORazepam **OR** LORazepam **OR** LORazepam **OR** [DISCONTINUED] LORazepam **OR** [DISCONTINUED] LORazepam    Past History    Past Medical History:   has a past medical history of CAD (coronary artery disease), Hyperlipidemia, Hypertension, Myocardial infarction ([address] and Tobacco abuse. Social History:   reports that he quit smoking about 8 years ago. His smoking use included cigarettes. He has a 25.00 pack-year smoking history. His smokeless tobacco use includes chew. He reports current alcohol use. He reports that he does not use drugs.      Family History:   Family History   Problem Relation Age of Onset    Heart Surgery Brother         stent placement, CABG    Other Brother         uses tobacco    Other Mother          MVA   Vannie Pulling Emphysema Father         former smoker, had worked in a \"stove plant\"    Other Son         paralyzed s/p MVA, then     No Known Problems Son     Drug Abuse Daughter        Physical Examination      Vitals:  BP (!) 159/94   Pulse 80 Comment: NSR per monitors  Temp 98 °F (36.7 °C) (Temporal)   Resp 20   Ht 6' 1\" (1.854 m)   Wt 184 lb 8 oz (83.7 kg)   SpO2 93%   BMI 24.34 kg/m²   Temp (24hrs), Av.6 °F (36.4 °C), Min:96.9 °F (36.1 °C), Max:98.2 °F (36.8 °C)      I/O (24Hr): Intake/Output Summary (Last 24 hours) at 9/10/2020 1022  Last data filed at 9/10/2020 0603  Gross per 24 hour   Intake 2206.63 ml   Output --   Net 2206.63 ml       Physical Exam  Vitals signs and nursing note reviewed. Constitutional:       Appearance: He is not ill-appearing or toxic-appearing. Comments: Fatigued appearing however improved alertness   HENT:      Head: Normocephalic and atraumatic. Nose: Nose normal.   Eyes:      General:         Right eye: No discharge. Left eye: No discharge. Conjunctiva/sclera: Conjunctivae normal.   Cardiovascular:      Rate and Rhythm: Normal rate and regular rhythm. Pulmonary:      Comments: 2 L nasal cannula oxygen in place  Abdominal:      General: Bowel sounds are normal.      Tenderness: There is no abdominal tenderness. There is no guarding or rebound. Musculoskeletal:         General: Tenderness present. Right lower leg: No edema. Left lower leg: No edema. Comments: Postoperative incision/bandage right hip    Diminished range of motion right lower extremity when compared to that of the left   Skin:     General: Skin is warm. Capillary Refill: Capillary refill takes less than 2 seconds. Neurological:      Comments: No tremor noted this a.m. Awake alert and oriented x3   Psychiatric:      Comments: Mentation improving         Labs/Imaging/Diagnostics   Labs:  CBC:  Recent Labs     09/08/20  0407 09/09/20  0156 09/10/20  0152   WBC 5.1 4.8 4.6*   RBC 2.45* 2.44* 2.43*   HGB 8.3* 8.2* 8.1*   HCT 24.9* 24.8* 24.7*   .6* 101.6* 101.6*   RDW 12.8 12.6 13.0   PLT 91* 103* 109*     CHEMISTRIES:  Recent Labs     09/08/20  0407 09/09/20 0156 09/10/20  0152    138 136   K 3.7 3.6 3.2*    103 103   CO2 25 25 24   BUN 12 9 9   CREATININE 0.5 0.4* 0.4*   GLUCOSE 96 96 99   MG  --   --  1.8     PT/INR:  No results for input(s): PROTIME, INR in the last 72 hours. APTT:  No results for input(s):  APTT in the last 72 hours. LIVER PROFILE:  No results for input(s): AST, ALT, BILIDIR, BILITOT, ALKPHOS in the last 72 hours. Imaging Last 24 Hours:  No results found.       Assessment        Hospital Problems           Last Modified POA    Closed comminuted intertrochanteric fracture of proximal end of right femur ([facility] 9/5/2020 Yes    Alcohol dependence, continuous ([facility] 9/5/2020 Yes    Alcohol withdrawal hallucinosis ([facility] 9/5/2020 Yes          Plan:        Alcohol dependence/alcohol withdrawal  - Librium taper decreased to twice daily -CIWA protocol in place, lorazepam as needed  -Banana bag  -Seizure precautions  -Thiamine/folate/multivitamin    Closed comminuted intertrochanteric fracture of the proximal end of the right femur  -Postop day #4 status post right hip hemiarthroplasty  -Preoperative H/H 12.5/38.5  -postoperative H/H 8.1/24.7  -COVID-19 screening not detected 9/6/2020  -Recommendation to scrutinize patient's respiratory status in the postoperative period, continue incentive spirometry efforts, PT/OT in the postoperative period, case management for safe discharge disposition -family requesting possible [facility] swing bed placement versus home with PT/OT  -Orthopedic surgery consulted and following appreciate further recs, anticoagulation per orthopedics -currently on Lovenox 40 mg    Acute postoperative anemia  -Preoperative H/H 12.5/38.5  -postoperative H/H 8.1/24.7  -Transfuse for hemoglobin less than 7  -Obtain daily CBC    Dyslipidemia   - Controlled   - Continue Statin therapy   Lab Results   Component Value Date    CHOL 151 (L) 07/08/2019   ,   Lab Results   Component Value Date    HDL 60 07/08/2019     Lab Results   Component Value Date    LDLCALC 77 07/08/2019   ,   Lab Results   Component Value Date    TRIG 72 07/08/2019       Essential hypertension-chronic condition, continue with antihypertensive regimen, routine vitals  -Currently uncontrolled, PRN modalities have been placed    Depression/anxiety/mood disorder-chronic conditions, continue with Lexapro/Abilify    Thrombocytopenia-improved, continue to monitor daily CBC        Electronically signed by   Sang King   Internal Medicine Hospitalist  On 9/10/2020  At 10:22 AM    EMR Dragon/Transcription disclaimer:   Much of this encounter note is an electronic transcription/translation of spoken language to printed text.  The electronic translation of spoken language may permit erroneous, or at times, nonsensical words or phrases to be inadvertently transcribed; although attempts have made to review the note for such errors, some may still exist.

## 2020-09-10 NOTE — PROGRESS NOTES
Orthopedic Surgery Progress Note    Thereasa Meth  9/10/2020      Subjective:     Systemic or Specific Complaints:  Family at [facility]. Slept well. Wanting to go home. Objective:     Patient Vitals for the past 24 hrs:   BP Temp Temp src Pulse Resp SpO2   09/10/20 0613 (!) 159/94 98 °F (36.7 °C) Temporal 81 20 93 %   09/10/20 0339 (!) 149/96 98.2 °F (36.8 °C) Temporal 80 18 95 %   09/10/20 0038 (!) 152/88 97.1 °F (36.2 °C) Temporal 77 20 93 %   09/09/20 1813 134/84 96.9 °F (36.1 °C) Temporal 71 18 92 %   09/09/20 1458 132/89 97.7 °F (36.5 °C) Temporal 86 18 90 %   09/09/20 1336 114/75 98 °F (36.7 °C) Temporal 87 18 94 %   09/09/20 1102 -- -- -- 84 -- --   09/09/20 1029 (!) 142/94 97.5 °F (36.4 °C) Temporal 82 18 92 %       right lower  General: alert, appears stated age and cooperative   Wound: Warm, dry and intact. Dressing: clean, dry, and intact   Extremity: Distal NVI           DVT Exam: No evidence of DVT seen on physical exam.                   Data Review:  Recent Labs     09/09/20  0156 09/10/20  0152   HGB 8.2* 8.1*     Recent Labs     09/10/20  0152      K 3.2*   CREATININE 0.4*       Assessment:     POD# 4, right hip mert    Plan:      1:  DVT prophylaxis, ICE, elevate  2:  Pain control  3:  Physical therapy/Occupational therapy  4:  Full weight bearing, with PHP, RLE.   5:  Placement.   Considering swing bed, [facility].       Electronically signed by Naman An PA-C on 9/10/2020 at 7:15 AM

## 2020-09-10 NOTE — PROGRESS NOTES
Occupational Therapy  Facility/Department: [facility] SURG SERVICES  Daily Treatment Note  NAME: Osiel Wiley  : 1945  MRN: [id number]    Date of Service: 9/10/2020    Discharge Recommendations:  Patient would benefit from continued therapy after discharge       Assessment   Assessment: Able to progress patient to walker level transfers for the first time today. Continue to recommend Verita Adjutant to nursing staff due to technical cueing and assist needed. Treatment Diagnosis: Right femur fx s/p Right hip hemiarthroplasty, fall  History: Etoh rehab to be discussed  REQUIRES OT FOLLOW UP: Yes  Activity Tolerance  Activity Tolerance: Patient Tolerated treatment well  Safety Devices  Safety Devices in place: Yes  Type of devices: Call light within reach; Chair alarm in place; Left in chair;Nurse notified         Patient Diagnosis(es): There were no encounter diagnoses. has a past medical history of CAD (coronary artery disease), Hyperlipidemia, Hypertension, Myocardial infarction [facility]), and Tobacco abuse.   has a past surgical history that includes Hernia repair; Coronary angioplasty with stent; Cardiac catheterization (); Cardiac catheterization (2016); Diagnostic Cardiac Cath Lab Procedure; Tonsillectomy; and hip surgery (Right, 2020).     Restrictions  Restrictions/Precautions  Restrictions/Precautions: Weight Bearing, Fall Risk, Seizure, Surgical Protocols  Required Braces or Orthoses?: Yes  Lower Extremity Weight Bearing Restrictions  Right Lower Extremity Weight Bearing: Weight Bearing As Tolerated  Required Braces or Orthoses  Right Lower Extremity Brace: Knee Immobilizer  Position Activity Restriction  Hip Precautions: Posterior hip precautions  Subjective   General  Chart Reviewed: Yes  Patient assessed for rehabilitation services?: Yes  Family / Caregiver Present: Yes(spouse)  Vital Signs  Patient Currently in Pain: No   Orientation  Orientation  Overall Orientation Status: Impaired  Orientation Level: Oriented to person;Disoriented to situation(requires prompts or choices for place/date)  Objective    ADL  Toileting: Maximum assistance(assist of two)        Balance  Sitting Balance: Contact guard assistance(much improved midline, no longer leaning to right)  Standing Balance: Moderate assistance(assist of two for safety)  Toilet Transfers  Toilet Transfer: Maximum assistance;2 Person assistance(Performed safety bar transfer with Max A of two from recliner to toilet--very challenging, did better returning to chair with use of walker.)  Toilet Transfers Comments: Recommend Antonia Thomas with assist of two for nursing. Bed mobility  Supine to Sit: Maximum assistance;2 Person assistance  Transfers  Stand Step Transfers: Moderate assistance;Maximum assistance;2 Person assistance  Transfer Comments: This is the first time he was able to stand well enough to participate in a walker level transfer. Takes very small steps and has difficulty initiating step with RLE                       Cognition  Cognition Comment: Spouse notes patient is more lethargic now that he has had his medications. The patient was sufficiently alert to participate meaningfully in therapy. Reviewed poterior precautions.            Positioning  Adaptations: Up in recliner, pillows positioned to keeps legs from adducting, heels off loaded                             Plan   Plan  Times per week: 4-8  G-Code     OutComes Score                                                  AM-PAC Score             Goals  Short term goals  Short term goal 1: Peform dressing with min A  Short term goal 2: Perform toileting with min A  Short term goal 3: Perform transfers with min A  Short term goal 4: Verbalize posterior hip precautions  Short term goal 5: Perform light ambulatory ADL with min a  Long term goals  Long term goal 1: Upgrade as tolerated       Therapy Time   Individual Concurrent Group Co-treatment   Time In 1010         Time Out [address] [facility] [place] Electronically signed by Lewis Solis OT on 9/10/2020 at 12:26 PM

## 2020-09-11 VITALS
RESPIRATION RATE: 18 BRPM | HEART RATE: 90 BPM | WEIGHT: 184.5 LBS | BODY MASS INDEX: 24.45 KG/M2 | OXYGEN SATURATION: 93 % | HEIGHT: 73 IN | SYSTOLIC BLOOD PRESSURE: 142 MMHG | TEMPERATURE: 98 F | DIASTOLIC BLOOD PRESSURE: 95 MMHG

## 2020-09-11 LAB
ANION GAP SERPL CALCULATED.3IONS-SCNC: 10 MMOL/L (ref 7–19)
BASOPHILS ABSOLUTE: 0 K/UL (ref 0–0.2)
BASOPHILS RELATIVE PERCENT: 0.4 % (ref 0–1)
BUN BLDV-MCNC: 9 MG/DL (ref 8–23)
CALCIUM SERPL-MCNC: 9.1 MG/DL (ref 8.8–10.2)
CHLORIDE BLD-SCNC: 101 MMOL/L (ref 98–111)
CO2: 24 MMOL/L (ref 22–29)
CREAT SERPL-MCNC: 0.4 MG/DL (ref 0.5–1.2)
EOSINOPHILS ABSOLUTE: 0.1 K/UL (ref 0–0.6)
EOSINOPHILS RELATIVE PERCENT: 2.2 % (ref 0–5)
GFR AFRICAN AMERICAN: >59
GFR NON-AFRICAN AMERICAN: >60
GLUCOSE BLD-MCNC: 109 MG/DL (ref 74–109)
HCT VFR BLD CALC: 24.8 % (ref 42–52)
HEMOGLOBIN: 8.1 G/DL (ref 14–18)
IMMATURE GRANULOCYTES #: 0 K/UL
LYMPHOCYTES ABSOLUTE: 0.9 K/UL (ref 1.1–4.5)
LYMPHOCYTES RELATIVE PERCENT: 18.2 % (ref 20–40)
MAGNESIUM: 1.9 MG/DL (ref 1.6–2.4)
MCH RBC QN AUTO: 33.3 PG (ref 27–31)
MCHC RBC AUTO-ENTMCNC: 32.7 G/DL (ref 33–37)
MCV RBC AUTO: 102.1 FL (ref 80–94)
MONOCYTES ABSOLUTE: 1.2 K/UL (ref 0–0.9)
MONOCYTES RELATIVE PERCENT: 23.5 % (ref 0–10)
NEUTROPHILS ABSOLUTE: 2.7 K/UL (ref 1.5–7.5)
NEUTROPHILS RELATIVE PERCENT: 55.3 % (ref 50–65)
PDW BLD-RTO: 13.2 % (ref 11.5–14.5)
PLATELET # BLD: 147 K/UL (ref 130–400)
PMV BLD AUTO: 9.2 FL (ref 9.4–12.4)
POTASSIUM REFLEX MAGNESIUM: 3.5 MMOL/L (ref 3.5–5)
RBC # BLD: 2.43 M/UL (ref 4.7–6.1)
SARS-COV-2, PCR: NOT DETECTED
SODIUM BLD-SCNC: 135 MMOL/L (ref 136–145)
WBC # BLD: 4.9 K/UL (ref 4.8–10.8)

## 2020-09-11 PROCEDURE — 2580000003 HC RX 258: Performed by: FAMILY MEDICINE

## 2020-09-11 PROCEDURE — 80048 BASIC METABOLIC PNL TOTAL CA: CPT

## 2020-09-11 PROCEDURE — 83735 ASSAY OF MAGNESIUM: CPT

## 2020-09-11 PROCEDURE — 85025 COMPLETE CBC W/AUTO DIFF WBC: CPT

## 2020-09-11 PROCEDURE — 6360000002 HC RX W HCPCS: Performed by: FAMILY MEDICINE

## 2020-09-11 PROCEDURE — 97535 SELF CARE MNGMENT TRAINING: CPT

## 2020-09-11 PROCEDURE — 6370000000 HC RX 637 (ALT 250 FOR IP): Performed by: FAMILY MEDICINE

## 2020-09-11 PROCEDURE — 94640 AIRWAY INHALATION TREATMENT: CPT

## 2020-09-11 PROCEDURE — U0003 INFECTIOUS AGENT DETECTION BY NUCLEIC ACID (DNA OR RNA); SEVERE ACUTE RESPIRATORY SYNDROME CORONAVIRUS 2 (SARS-COV-2) (CORONAVIRUS DISEASE [COVID-19]), AMPLIFIED PROBE TECHNIQUE, MAKING USE OF HIGH THROUGHPUT TECHNOLOGIES AS DESCRIBED BY CMS-2020-01-R: HCPCS

## 2020-09-11 PROCEDURE — 97530 THERAPEUTIC ACTIVITIES: CPT

## 2020-09-11 PROCEDURE — 36415 COLL VENOUS BLD VENIPUNCTURE: CPT

## 2020-09-11 RX ORDER — POLYETHYLENE GLYCOL 3350 17 G/17G
17 POWDER, FOR SOLUTION ORAL 2 TIMES DAILY
Qty: 527 G | Refills: 1 | Status: SHIPPED | OUTPATIENT
Start: 2020-09-11 | End: 2020-10-08 | Stop reason: ALTCHOICE

## 2020-09-11 RX ORDER — CHLORDIAZEPOXIDE HYDROCHLORIDE 25 MG/1
CAPSULE, GELATIN COATED ORAL
Qty: 3 CAPSULE | Refills: 0 | Status: SHIPPED | OUTPATIENT
Start: 2020-09-11 | End: 2020-09-13

## 2020-09-11 RX ORDER — HYDROCODONE BITARTRATE AND ACETAMINOPHEN 5; 325 MG/1; MG/1
1 TABLET ORAL EVERY 4 HOURS PRN
Qty: 18 TABLET | Refills: 0 | Status: SHIPPED | OUTPATIENT
Start: 2020-09-11 | End: 2020-09-14

## 2020-09-11 RX ADMIN — METOPROLOL SUCCINATE 50 MG: 50 TABLET, EXTENDED RELEASE ORAL at 09:42

## 2020-09-11 RX ADMIN — IPRATROPIUM BROMIDE AND ALBUTEROL SULFATE 1 AMPULE: .5; 3 SOLUTION RESPIRATORY (INHALATION) at 06:26

## 2020-09-11 RX ADMIN — ROSUVASTATIN CALCIUM 20 MG: 20 TABLET, FILM COATED ORAL at 09:43

## 2020-09-11 RX ADMIN — NIFEDIPINE 30 MG: 30 TABLET, FILM COATED, EXTENDED RELEASE ORAL at 09:43

## 2020-09-11 RX ADMIN — CHLORDIAZEPOXIDE HYDROCHLORIDE 25 MG: 25 CAPSULE ORAL at 09:43

## 2020-09-11 RX ADMIN — IPRATROPIUM BROMIDE AND ALBUTEROL SULFATE 1 AMPULE: .5; 3 SOLUTION RESPIRATORY (INHALATION) at 14:35

## 2020-09-11 RX ADMIN — RAMIPRIL 10 MG: 10 CAPSULE ORAL at 09:43

## 2020-09-11 RX ADMIN — IPRATROPIUM BROMIDE AND ALBUTEROL SULFATE 1 AMPULE: .5; 3 SOLUTION RESPIRATORY (INHALATION) at 10:26

## 2020-09-11 RX ADMIN — ENOXAPARIN SODIUM 40 MG: 40 INJECTION SUBCUTANEOUS at 09:43

## 2020-09-11 RX ADMIN — SODIUM CHLORIDE, PRESERVATIVE FREE 10 ML: 5 INJECTION INTRAVENOUS at 10:37

## 2020-09-11 RX ADMIN — ARIPIPRAZOLE 2 MG: 2 TABLET ORAL at 09:43

## 2020-09-11 RX ADMIN — ESCITALOPRAM OXALATE 20 MG: 10 TABLET ORAL at 09:43

## 2020-09-11 NOTE — PROGRESS NOTES
Patient was able to use the bedpan earlier today and his linens were changed. Patient's primapore dressing fell off when cleaning patient so a new one was applied to his surgical incision. Incision was clean, dry, and intact before the new dressing was applied. Will continue to monitor.  Electronically signed by Yolanda Vasquez RN on 9/11/2020 at 4:10 PM

## 2020-09-11 NOTE — PROGRESS NOTES
Report called to Chani at [facility]. [id number] [facility] notified that patient is ready to be transported.  Electronically signed by Eduardo Soto RN on 9/11/2020 at 1:56 PM

## 2020-09-11 NOTE — PROGRESS NOTES
Occupational Therapy  Facility/Department: [facility] SURG SERVICES  Daily Treatment Note  NAME: Karla Andino  : 1945  MRN: [id number]    Date of Service: 2020    Discharge Recommendations:  Patient would benefit from continued therapy after discharge       Assessment   Assessment: Still in need of additional therapy to progress patient to a level where spouse can assist patient. Making daily progress. Treatment Diagnosis: Right femur fx s/p Right hip hemiarthroplasty, fall  History: Etoh rehab to be discussed  REQUIRES OT FOLLOW UP: Yes  Activity Tolerance  Activity Tolerance: Patient Tolerated treatment well  Safety Devices  Type of devices: Bed alarm in place;Call light within reach; Left in bed         Patient Diagnosis(es): The primary encounter diagnosis was Closed comminuted intertrochanteric fracture of right femur, initial encounter ([facility] A diagnosis of Alcohol withdrawal hallucinosis (HCC) was also pertinent to this visit. has a past medical history of CAD (coronary artery disease), Hyperlipidemia, Hypertension, Myocardial infarction [facility]), and Tobacco abuse.   has a past surgical history that includes Hernia repair; Coronary angioplasty with stent; Cardiac catheterization (); Cardiac catheterization (2016); Diagnostic Cardiac Cath Lab Procedure; Tonsillectomy; and hip surgery (Right, 2020).     Restrictions  Restrictions/Precautions  Restrictions/Precautions: Weight Bearing, Fall Risk, Seizure, Surgical Protocols  Required Braces or Orthoses?: Yes  Lower Extremity Weight Bearing Restrictions  Right Lower Extremity Weight Bearing: Weight Bearing As Tolerated  Required Braces or Orthoses  Right Lower Extremity Brace: Knee Immobilizer  Position Activity Restriction  Hip Precautions: Posterior hip precautions  Subjective   General  Chart Reviewed: Yes  Patient assessed for rehabilitation services?: Yes  Family / Caregiver Present: Yes  Vital Signs  Patient Currently in Pain: No In           Time Out  1038         Minutes  Mansi Jimenes Electronically signed by Kyrie Buitrago OT on 9/11/2020 at 12:11 PM

## 2020-09-11 NOTE — DISCHARGE SUMMARY
Discharge Summary  Date:9/11/2020        Patient Name:Andrea Willingham     YOB: 1945     Age:74 y.o. Admit Date:9/5/2020   Admission Condition:poor   Discharged Condition:fair  Discharge Date: 09/11/20     Discharge Diagnoses   Active Problems:    Closed comminuted intertrochanteric fracture of proximal end of right femur ([address]    Alcohol dependence, continuous (HCC)    Alcohol withdrawal hallucinosis ([facility]  Resolved Problems:    * No resolved hospital problems. [facility] Stay   Narrative of Hospital Course: Patient was transferred to [facility] surgical unit from [facility] due to right femoral neck fracture. Per patient states he had a period of passing out, attempted to answer the doorbell fell did not hit his head. CT head without contrast noted to be no acute cranial process. History of alcohol abuse, last known drink was 4:30 AM, consumes approximately half a pint of vodka.,  COVID-19 not detected, patient underwent right hip hemiarthroplasty 9/6/2020   Transition to ICU due to symptoms of alcoholic withdrawal, placed on Precedex, initiation of Librium/banana bag. His clinical picture improved with transferred out of ICU back to surgical unit. Patient continues with Librium taper dosing will continue on discharge for the next 2 days, continue with physical therapy/Occupational Therapy efforts. Patient accepted to [facility] swing bed, cleared for discharge pending COVID-19 screening. Patient to follow-up with primary care provider in 1 to 2 weeks for hospital discharge as well as ongoing coordination of care. Postop instructions per orthopedic surgery as follow:   Continue DVT prophylaxis, ice as well as elevation, continue with pain control modalities, continue PT/OT, full weightbearing with PHP, right lower extremity, follow-up with orthopedic surgery in 2 weeks time.     Per orthopedic surgery-anticoagulation course will be needed for a total of 3 weeks, patient postop 9/6/2020 has been placed on Lovenox 40 mg daily since that time. We will continue with Lovenox upon discharge to [facility] swing bed, if discharged prior to 3 weeks completion of anticoagulation, transition patient to Xarelto 10 mg daily. Consultants:   IP CONSULT TO SOCIAL WORK  IP CONSULT TO ORTHOPEDIC SURGERY  IP CONSULT TO SOCIAL WORK    Time Spent on Discharge:  45 minutes were spent in patient examination, evaluation, counseling as well as medication reconciliation, prescriptions for required medications, discharge plan and follow up.       Surgeries/Procedures Performed:  Procedure(s):  HIP HEMIARTHROPLASTY     Treatments:   IV hydration, analgesia: acetaminophen and Dilaudid as needed, oxycodone, cardiac meds: Hydralazine, labetalol, metoprolol, nifedipine, ramipril, Crestor, anticoagulation: LMW heparin, respiratory therapy: O2, therapies: PT, OT and ST and alcoholic CIWA scale, electrolyte stabilization    Significant Diagnostic Studies:   Recent Labs:  CBC:   Lab Results   Component Value Date    WBC 4.9 09/11/2020    RBC 2.43 09/11/2020    HGB 8.1 09/11/2020    HCT 24.8 09/11/2020    .1 09/11/2020    MCH 33.3 09/11/2020    MCHC 32.7 09/11/2020    RDW 13.2 09/11/2020     09/11/2020     BMP:    Lab Results   Component Value Date    GLUCOSE 109 09/11/2020     09/11/2020    K 3.5 09/11/2020     09/11/2020    CO2 24 09/11/2020    ANIONGAP 10 09/11/2020    BUN 9 09/11/2020    CREATININE 0.4 09/11/2020    CALCIUM 9.1 09/11/2020    LABGLOM >60 09/11/2020    GFRAA >59 09/11/2020     HFP:    Lab Results   Component Value Date    PROT 7.0 09/05/2020     CMP:    Lab Results   Component Value Date    GLUCOSE 109 09/11/2020     09/11/2020    K 3.5 09/11/2020     09/11/2020    CO2 24 09/11/2020    BUN 9 09/11/2020    CREATININE 0.4 09/11/2020    ANIONGAP 10 09/11/2020    ALKPHOS 101 09/05/2020     09/05/2020     09/05/2020    BILITOT 0.7 09/05/2020    LABALBU Mentation continues to improve         Discharge Plan   Disposition: To a non-[facility] facility - [facility] Bed     Provider Follow-Up:   Elvis Harper, APRN - CNP  [address] [facility] [address]  [address], [place]  [address]  [phone]    On 9/25/2020  Post-operative follow-up with X-rays. Hospital/Incidental Findings Requiring Follow-Up:  Close conizations for alcoholic withdrawal symptoms    Patient Instructions   Diet: regular diet    Activity: As instructed by orthopedic surgery        Discharge Medications         Medication List      START taking these medications    chlordiazePOXIDE 25 MG capsule  Commonly known as:  LIBRIUM  Take 1 capsule by mouth 2 times daily for 1 day, THEN 1 capsule daily for 1 day. Start taking on:  September 11, 2020     enoxaparin 40 MG/0.4ML injection  Commonly known as:  LOVENOX  Inject 0.4 mLs into the skin daily for 15 days  Start taking on:  September 12, 2020     HYDROcodone-acetaminophen 5-325 MG per tablet  Commonly known as:  Norco  Take 1 tablet by mouth every 4 hours as needed for Pain for up to 3 days. Intended supply: 3 days. Take lowest dose possible to manage pain     polyethylene glycol 17 g packet  Commonly known as:  GLYCOLAX  Take 17 g by mouth 2 times daily        CONTINUE taking these medications    ARIPiprazole 2 MG tablet  Commonly known as:  ABILIFY     escitalopram 20 MG tablet  Commonly known as:  LEXAPRO     fish oil-omega-3 fatty acids 1000 MG capsule     metoprolol succinate 50 MG extended release tablet  Commonly known as:  TOPROL XL  TAKE ONE TABLET EVERY DAY     NIFEdipine 30 MG extended release tablet  Commonly known as:  ADALAT CC  TAKE 1 TABLET BY MOUTH DAILY     nitroGLYCERIN 0.4 MG SL tablet  Commonly known as:  NITROSTAT  Dissolve 1 tablet under tongue for chest pain and repeat every 5 min up to max of 3 total doses.   If no relief after 3 doses call 911     ramipril 10 MG capsule  Commonly known as:  ALTACE  TAKE ONE CAPSULE BY MOUTH TWICE DAILY     rosuvastatin 20 MG tablet  Commonly known as:  CRESTOR  Take 1 tablet by mouth daily        STOP taking these medications    aspirin 81 MG EC tablet     EQ Sleep Aid 50 MG Caps  Generic drug:  diphenhydrAMINE HCl (Sleep)           Where to Get Your Medications      These medications were sent to [address] [place] [address], [address]  [phone] - F [phone]  [address] [place] [address] Professor Dhaval [place] [address]    Phone:  [phone]   · polyethylene glycol 17 g packet     You can get these medications from any pharmacy    Bring a paper prescription for each of these medications  · chlordiazePOXIDE 25 MG capsule  · HYDROcodone-acetaminophen 5-325 MG per tablet     Information about where to get these medications is not yet available    Ask your nurse or doctor about these medications  · enoxaparin 40 MG/0.4ML injection       Electronically signed by   Kelly Carrillo   Internal Medicine Hospitalist  On 9/11/2020  At 10:53 AM    EMR Dragon/Transcription disclaimer:   Much of this encounter note is an electronic transcription/translation of spoken language to printed text.  The electronic translation of spoken language may permit erroneous, or at times, nonsensical words or phrases to be inadvertently transcribed; although attempts have made to review the note for such errors, some may still exist.

## 2020-09-11 NOTE — PROGRESS NOTES
Orthopedic Surgery Progress Note    Jd Caruso  9/11/2020      Subjective:     Systemic or Specific Complaints:  Wife at bedside. Doing well with pain. Answers all questions  Appropriately this morning. Objective:     Patient Vitals for the past 24 hrs:   BP Temp Temp src Pulse Resp SpO2   09/11/20 0645 (!) 142/95 98 °F (36.7 °C) Temporal 90 20 91 %   09/11/20 0626 -- -- -- -- 20 92 %   09/11/20 0431 (!) 149/88 96.7 °F (35.9 °C) Temporal 78 18 92 %   09/10/20 2235 (!) 140/90 98.3 °F (36.8 °C) Temporal 88 16 92 %   09/10/20 1912 (!) 144/89 99.2 °F (37.3 °C) Temporal 97 18 91 %   09/10/20 1434 105/74 98.4 °F (36.9 °C) Temporal 81 20 92 %   09/10/20 1147 -- -- -- -- -- 94 %   09/10/20 0819 -- -- -- 80 -- --       right lower  General: alert, appears stated age and cooperative   Wound: Clean, dry and intact. Dressing: clean, dry, and intact   Extremity: Distal NVI           DVT Exam: No evidence of DVT seen on physical exam.                   Data Review:  Recent Labs     09/10/20  0152 09/11/20  0245   HGB 8.1* 8.1*     Recent Labs     09/11/20  0245   *   K 3.5   CREATININE 0.4*       Assessment:     POD# 4, right hip emrt    Plan:      1:  DVT prophylaxis, ICE, elevate  2:  Pain control  3:  Physical therapy/Occupational therapy  4:  Full weight bearing, with PHP, RLE.   5:  F/u in office in 2 weeks.         Electronically signed by Heena Sheth PA-C on 9/11/2020 at 7:05 AM  Orthopedic Surgery Progress Note

## 2020-09-11 NOTE — CARE COORDINATION
Pt has been accepted at [facility] and pre-cert cleared for Pt to DC on Friday September 11th.   [facility]   N  [phone] [id number]  Electronically signed by Benson Ordoñez on 9/11/2020 at 9:12 AM

## 2020-09-14 ENCOUNTER — TELEPHONE (OUTPATIENT)
Dept: INPATIENT UNIT | Age: 75
End: 2020-09-14

## 2020-10-02 ENCOUNTER — TELEPHONE (OUTPATIENT)
Dept: CARDIOLOGY | Age: 75
End: 2020-10-02

## 2020-10-02 NOTE — TELEPHONE ENCOUNTER
Monika Lemos requests that office return their call. The best time to reach him is Anytime. [facility] called patient needs get appointment as soon as possible patient  been having low b/p,please call the patient. Thank you.

## 2020-10-05 ENCOUNTER — OFFICE VISIT (OUTPATIENT)
Dept: CARDIOLOGY | Age: 75
End: 2020-10-05
Payer: MEDICARE

## 2020-10-05 VITALS
WEIGHT: 166 LBS | HEART RATE: 76 BPM | OXYGEN SATURATION: 97 % | SYSTOLIC BLOOD PRESSURE: 92 MMHG | HEIGHT: 73 IN | BODY MASS INDEX: 22 KG/M2 | DIASTOLIC BLOOD PRESSURE: 62 MMHG

## 2020-10-05 DIAGNOSIS — E78.2 MIXED HYPERLIPIDEMIA: ICD-10-CM

## 2020-10-05 LAB
ALBUMIN SERPL-MCNC: 3.6 G/DL (ref 3.5–5.2)
ALP BLD-CCNC: 183 U/L (ref 40–130)
ALT SERPL-CCNC: 19 U/L (ref 5–41)
ANION GAP SERPL CALCULATED.3IONS-SCNC: 9 MMOL/L (ref 7–19)
AST SERPL-CCNC: 31 U/L (ref 5–40)
BILIRUB SERPL-MCNC: 0.4 MG/DL (ref 0.2–1.2)
BUN BLDV-MCNC: 13 MG/DL (ref 8–23)
CALCIUM SERPL-MCNC: 10.1 MG/DL (ref 8.8–10.2)
CHLORIDE BLD-SCNC: 104 MMOL/L (ref 98–111)
CHOLESTEROL, TOTAL: 152 MG/DL (ref 160–199)
CO2: 27 MMOL/L (ref 22–29)
CREAT SERPL-MCNC: 0.8 MG/DL (ref 0.5–1.2)
GFR AFRICAN AMERICAN: >59
GFR NON-AFRICAN AMERICAN: >60
GLUCOSE BLD-MCNC: 94 MG/DL (ref 74–109)
HDLC SERPL-MCNC: 47 MG/DL (ref 55–121)
LDL CHOLESTEROL CALCULATED: 82 MG/DL
POTASSIUM SERPL-SCNC: 4.1 MMOL/L (ref 3.5–5)
SODIUM BLD-SCNC: 140 MMOL/L (ref 136–145)
TOTAL PROTEIN: 7.1 G/DL (ref 6.6–8.7)
TRIGL SERPL-MCNC: 116 MG/DL (ref 0–149)

## 2020-10-05 PROCEDURE — G8427 DOCREV CUR MEDS BY ELIG CLIN: HCPCS | Performed by: CLINICAL NURSE SPECIALIST

## 2020-10-05 PROCEDURE — G8420 CALC BMI NORM PARAMETERS: HCPCS | Performed by: CLINICAL NURSE SPECIALIST

## 2020-10-05 PROCEDURE — 4040F PNEUMOC VAC/ADMIN/RCVD: CPT | Performed by: CLINICAL NURSE SPECIALIST

## 2020-10-05 PROCEDURE — 1123F ACP DISCUSS/DSCN MKR DOCD: CPT | Performed by: CLINICAL NURSE SPECIALIST

## 2020-10-05 PROCEDURE — 3017F COLORECTAL CA SCREEN DOC REV: CPT | Performed by: CLINICAL NURSE SPECIALIST

## 2020-10-05 PROCEDURE — 4004F PT TOBACCO SCREEN RCVD TLK: CPT | Performed by: CLINICAL NURSE SPECIALIST

## 2020-10-05 PROCEDURE — G8484 FLU IMMUNIZE NO ADMIN: HCPCS | Performed by: CLINICAL NURSE SPECIALIST

## 2020-10-05 PROCEDURE — 1111F DSCHRG MED/CURRENT MED MERGE: CPT | Performed by: CLINICAL NURSE SPECIALIST

## 2020-10-05 PROCEDURE — 99214 OFFICE O/P EST MOD 30 MIN: CPT | Performed by: CLINICAL NURSE SPECIALIST

## 2020-10-05 RX ORDER — RIVAROXABAN 10 MG/1
10 TABLET, FILM COATED ORAL DAILY
COMMUNITY
End: 2020-11-10

## 2020-10-05 RX ORDER — CHLORDIAZEPOXIDE HYDROCHLORIDE 5 MG/1
5 CAPSULE, GELATIN COATED ORAL 2 TIMES DAILY
COMMUNITY

## 2020-10-05 ASSESSMENT — ENCOUNTER SYMPTOMS
NAUSEA: 0
ABDOMINAL PAIN: 0
SHORTNESS OF BREATH: 0
VOMITING: 0
FACIAL SWELLING: 0
EYE REDNESS: 0
CHEST TIGHTNESS: 0
WHEEZING: 0
COUGH: 0

## 2020-10-05 NOTE — PROGRESS NOTES
[facility], [address], [address] [address]  Phone: [phone]  Fax: [phone]    OFFICE VISIT:  10/5/2020    [address] [facility] 1945    Reason For Visit:  Wesley Alatorre is a 76 y.o. male who is here for Other (Patient states he has been having low BP ); Coronary Artery Disease; and Hypertension       Diagnosis Orders   1. Hypotension due to drugs     2. Coronary artery disease involving native coronary artery of native heart with angina pectoris ([address]     3. Mixed hyperlipidemia  Lipid Panel    Comprehensive Metabolic Panel   4. Alcohol dependence in remission ([facility]     5. Closed fracture of right hip, sequela           HPI  Patient is here today with a history of CAD, hypertension, hyperlipidemia. He recently had a fall and a fractured right hip and recently was discharged from the hospital.  He is alcoholic, and is no longer drinking since hospital discharge. He has lost a significant amount of weight. He iss concerned today that his blood pressure is too low and that is a reason for visit today. Brandon Watson, APRN - CNP is PCP.   Anastacia Mcguire has the following history as recorded in [facility]:    Patient Active Problem List    Diagnosis Date Noted    ACS (acute coronary syndrome) [facility])      Priority: High    Closed comminuted intertrochanteric fracture of proximal end of right femur ([facility] 09/05/2020    Alcohol dependence, continuous ([address] 09/05/2020    Alcohol withdrawal hallucinosis ([facility] 09/05/2020    Coronary artery disease involving native coronary artery of native heart without angina pectoris 12/06/2016    CAD (coronary artery disease)     Smoker     Myocardial infarction ([address]     Hyperlipidemia     Essential hypertension      Past Medical History:   Diagnosis Date    CAD (coronary artery disease)     Hyperlipidemia     Dr. Renny Leyva Hypertension     subendocardial    Myocardial infarction ([facility]     Tobacco abuse      Past Surgical History:   Procedure Laterality Date    CARDIAC CATHETERIZATION      EF in excess of 50%    CARDIAC CATHETERIZATION  2016    Two bare metal stents to right coronary artery. Mary Kern M.D.   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\"    Other Son         paralyzed s/p MVA, then    [employer] No Known Problems Son     Drug Abuse Daughter      Social History     Tobacco Use    Smoking status: Former Smoker     Packs/day: 25.00     Years: 1.00     Pack years: 25.00     Types: Cigarettes     Last attempt to quit: 2012     Years since quittin.4    Smokeless tobacco: Current User     Types: Chew    Tobacco comment: chews a can a day,    Substance Use Topics    Alcohol use: Yes     Comment: 3/4 pint per day of liqour, never had a problem with not drinking      Current Outpatient Medications   Medication Sig Dispense Refill    rivaroxaban (XARELTO) 10 MG TABS tablet Take 10 mg by mouth daily      chlordiazePOXIDE (LIBRIUM) 5 MG capsule Take 5 mg by mouth 2 times daily.       polyethylene glycol (GLYCOLAX) 17 g packet Take 17 g by mouth 2 times daily 527 g 1    ARIPiprazole (ABILIFY) 2 MG tablet Take 2 mg by mouth daily      escitalopram (LEXAPRO) 20 MG tablet Take 20 mg by mouth daily      ramipril (ALTACE) 10 MG capsule TAKE ONE CAPSULE BY MOUTH TWICE DAILY 60 capsule 5    NIFEdipine (ADALAT CC) 30 MG extended release tablet TAKE 1 TABLET BY MOUTH DAILY 90 tablet 3    Comments: Frail, deconditioned   HENT:      Head: Normocephalic and atraumatic. Right Ear: Hearing and external ear normal.      Left Ear: Hearing and external ear normal.      Nose: Nose normal.   Eyes:      General:         Right eye: No discharge. Left eye: No discharge. Pupils: Pupils are equal, round, and reactive to light. Neck:      Musculoskeletal: Neck supple. No muscular tenderness. Thyroid: No thyromegaly. Vascular: No carotid bruit or JVD. Trachea: No tracheal deviation. Cardiovascular:      Rate and Rhythm: Normal rate and regular rhythm. Heart sounds: Normal heart sounds. No murmur. No friction rub. No gallop. Pulmonary:      Effort: Pulmonary effort is normal. No respiratory distress. Breath sounds: Normal breath sounds. No wheezing or rales. Abdominal:      Palpations: Abdomen is soft. Tenderness: There is no abdominal tenderness. Musculoskeletal:         General: No swelling or deformity. Comments: In wheelchair   Skin:     General: Skin is warm and dry. Findings: No rash. Neurological:      General: No focal deficit present. Mental Status: He is alert and oriented to person, place, and time. Cranial Nerves: No cranial nerve deficit. Psychiatric:         Mood and Affect: Mood normal.         Behavior: Behavior normal.         Judgment: Judgment normal.         Data:  Lab Results   Component Value Date    CHOL 151 (L) 07/08/2019    TRIG 72 07/08/2019    HDL 60 07/08/2019    LDLCALC 77 07/08/2019    LDLDIRECT 70 (L) 12/02/2015     Lab Results   Component Value Date     (H) 09/05/2020     (H) 09/05/2020       Assessment:     Diagnosis Orders   1. Hypotension due to drugs     2. Coronary artery disease involving native coronary artery of native heart with angina pectoris ([address]     3. Mixed hyperlipidemia  Lipid Panel    Comprehensive Metabolic Panel   4.  Alcohol dependence in remission ([address]     5. Closed fracture of right hip, sequela         Hypotension- likely related to weight loss. Stop nifedipine completely. Continue metoprolol and ramipril. Monitor blood pressure at home and close follow-up again in a few weeks. I have encouraged hydration with 6 to 8 cups of fluid daily    CAD-stable that symptoms of angina    Hyperlipidemia-on statin therapy. Check fasting lipid profile and CMP today    Alcohol dependence-in early remission. No alcohol use since hospital discharge, approximately 1 month. On Librium    Hip fracture- did inpatient rehab for several weeks and now will be starting home health therapy. Stable cardiovascular status. No evidence of overt heart failure,angina or dysrhythmia. Plan    Orders Placed This Encounter   Procedures    Lipid Panel     Standing Status:   Future     Standing Expiration Date:   10/5/2021     Order Specific Question:   Is Patient Fasting?/# of Hours     Answer:   yes    Comprehensive Metabolic Panel     Standing Status:   Future     Standing Expiration Date:   10/5/2021     Return in about 1 month (around 11/5/2020) for SYMONE. Stop Nifedipine (Adalat) completely  Check cholesterol fasting  Drink 6-8 cups fluid daily    Call with any questionsor concerns  Follow up with SYMONE Gray - CNP for non cardiac problems  Report any new problems  Cardiovascular Fitness-Exercise as tolerated. Strive for 15 minutes of exercise most days of the week. Cardiac / HealthyDiet  Continue current medications as directed  Continue plan of treatment  It is always recommended that you bring your medicationsbottles with you to each visit - this is for your safety!        SYMONE Schroeder

## 2020-10-08 ENCOUNTER — OFFICE VISIT (OUTPATIENT)
Dept: GASTROENTEROLOGY | Age: 75
End: 2020-10-08
Payer: MEDICARE

## 2020-10-08 VITALS
BODY MASS INDEX: 22.13 KG/M2 | WEIGHT: 167 LBS | OXYGEN SATURATION: 96 % | HEIGHT: 73 IN | DIASTOLIC BLOOD PRESSURE: 74 MMHG | HEART RATE: 92 BPM | SYSTOLIC BLOOD PRESSURE: 114 MMHG

## 2020-10-08 PROBLEM — R74.01 TRANSAMINITIS: Status: ACTIVE | Noted: 2020-10-08

## 2020-10-08 PROCEDURE — G8427 DOCREV CUR MEDS BY ELIG CLIN: HCPCS | Performed by: NURSE PRACTITIONER

## 2020-10-08 PROCEDURE — G8484 FLU IMMUNIZE NO ADMIN: HCPCS | Performed by: NURSE PRACTITIONER

## 2020-10-08 PROCEDURE — 99213 OFFICE O/P EST LOW 20 MIN: CPT | Performed by: NURSE PRACTITIONER

## 2020-10-08 PROCEDURE — 4040F PNEUMOC VAC/ADMIN/RCVD: CPT | Performed by: NURSE PRACTITIONER

## 2020-10-08 PROCEDURE — G8420 CALC BMI NORM PARAMETERS: HCPCS | Performed by: NURSE PRACTITIONER

## 2020-10-08 PROCEDURE — 4004F PT TOBACCO SCREEN RCVD TLK: CPT | Performed by: NURSE PRACTITIONER

## 2020-10-08 PROCEDURE — 3017F COLORECTAL CA SCREEN DOC REV: CPT | Performed by: NURSE PRACTITIONER

## 2020-10-08 PROCEDURE — 1111F DSCHRG MED/CURRENT MED MERGE: CPT | Performed by: NURSE PRACTITIONER

## 2020-10-08 PROCEDURE — 1123F ACP DISCUSS/DSCN MKR DOCD: CPT | Performed by: NURSE PRACTITIONER

## 2020-10-08 ASSESSMENT — ENCOUNTER SYMPTOMS
RECTAL PAIN: 0
BLOOD IN STOOL: 0
TROUBLE SWALLOWING: 0
CONSTIPATION: 0
NAUSEA: 0
SORE THROAT: 0
ABDOMINAL PAIN: 0
SHORTNESS OF BREATH: 0
COUGH: 0
ANAL BLEEDING: 0
VOICE CHANGE: 0
DIARRHEA: 0
VOMITING: 0
ABDOMINAL DISTENTION: 0
BACK PAIN: 0

## 2020-10-08 NOTE — PROGRESS NOTES
Subjective:      Louise Villareal is a76 y.o. male  Chief Complaint   Patient presents with    New Patient     for elevated liver enzymes       HPI  PCP: SYMONE Tamayo - CNP   Referring Provider: Chrissy Gaytan CNP  New pt referral.  For transaminitis. Labs 9/2020 per referring provider notes AST 52 (<37), ALT normal, alk phone [phone] (<116), and total bili normal.  Pt admits he used to drink ETOH, last drink was 9/5/2020. Drank a pint of gin a day for 2-3 years. He has never been told he has any liver disease in the past.  Denies symptoms suggestive of advanced liver disease with decompensation. He has no GI complaints. On xarelto, pt reports this is short term s/p recent hip replacement. Family HX:    Pt denies family hx of colon polyps, colon CA, inflammatory bowel dx, gastric CA and esophageal CA. Past Medical History:   Diagnosis Date    CAD (coronary artery disease)     Hyperlipidemia     Dr. Xuan Curiel Hypertension     subendocardial    Myocardial infarction ([facility]     Tobacco abuse           Past Surgical History:   Procedure Laterality Date    CARDIAC CATHETERIZATION  2002    EF in excess of 50%    CARDIAC CATHETERIZATION  12/06/2016    Two bare metal stents to right coronary artery. Arpit Alexis M.D.    COLONOSCOPY  2017    \"Normal\" per patient     CORONARY ANGIOPLASTY WITH STENT PLACEMENT      DIAGNOSTIC CARDIAC CATH LAB PROCEDURE      HERNIA REPAIR      multiple: 03/25/02 right groin, had done both sides ~1980    HIP SURGERY Right 9/6/2020    HIP HEMIARTHROPLASTY performed by Kraig Barry MD at [place] [address]      as a child       Social History     Socioeconomic History    Marital status:      Spouse name: Mrs. Zhao [id number] Number of children: 3    Years of education: None    Highest education level: None   Occupational History    Occupation:    Social Needs    Financial resource strain: None    Food insecurity     Worry: TAKE ONE TABLET EVERY DAY (Patient taking differently: as needed ) 90 tablet 2    nitroGLYCERIN (NITROSTAT) 0.4 MG SL tablet Dissolve 1 tablet under tongue for chest pain and repeat every 5 min up to max of 3 total doses. If no relief after 3 doses call 911 25 tablet 3    rosuvastatin (CRESTOR) 20 MG tablet Take 1 tablet by mouth daily 30 tablet 3    fish oil-omega-3 fatty acids 1000 MG capsule Take 1,000 mg by mouth daily        No current facility-administered medications for this visit. Review of Systems   Constitutional: Negative for appetite change, fatigue, fever and unexpected weight change. HENT: Negative for sore throat, trouble swallowing and voice change. Respiratory: Negative for cough and shortness of breath. Cardiovascular: Negative for chest pain, palpitations and leg swelling. Gastrointestinal: Negative for abdominal distention, abdominal pain, anal bleeding, blood in stool, constipation, diarrhea, nausea, rectal pain and vomiting. Genitourinary: Negative for hematuria. Musculoskeletal: Negative for arthralgias, back pain and neck pain. Neurological: Negative for dizziness, weakness, light-headedness and headaches. Psychiatric/Behavioral: Negative for dysphoric mood and sleep disturbance. The patient is not nervous/anxious. All other systems reviewed and are negative. Objective:     Physical Exam  Vitals signs and nursing note reviewed. Constitutional:       Appearance: He is well-developed. Comments: /74   Pulse 92   Ht 6' 1\" (1.854 m)   Wt 167 lb (75.8 kg)   SpO2 96%   BMI 22.03 kg/m²    Eyes:      General: No scleral icterus. Conjunctiva/sclera: Conjunctivae normal.      Pupils: Pupils are equal, round, and reactive to light. Neck:      Musculoskeletal: Normal range of motion and neck supple. Thyroid: No thyromegaly. Cardiovascular:      Rate and Rhythm: Normal rate and regular rhythm. Heart sounds: Normal heart sounds.  No murmur. No friction rub. No gallop. Pulmonary:      Effort: Pulmonary effort is normal. No respiratory distress. Breath sounds: Normal breath sounds. Abdominal:      General: Bowel sounds are normal. There is no distension. Palpations: Abdomen is soft. Tenderness: There is no abdominal tenderness. There is no rebound. Musculoskeletal: Normal range of motion. General: No deformity. Skin:     Coloration: Skin is not pale. Neurological:      Mental Status: He is alert and oriented to person, place, and time. Cranial Nerves: No cranial nerve deficit. Psychiatric:         Judgment: Judgment normal.           Assessment:       Diagnosis Orders   1. Transaminitis           Plan:      1. We discussed work up for tranaminitis, including labs and liver US. Pt defers at this time. Would like his PCP to monitor his liver enzymes. I advised him I recommend a least getting a liver US, if not done recently. He voiced understanding and wants to f/u with his PCP for this.

## 2020-11-03 PROBLEM — I25.10 CAD (CORONARY ARTERY DISEASE): Status: RESOLVED | Noted: 2020-11-03 | Resolved: 2020-11-03

## 2020-11-10 ENCOUNTER — OFFICE VISIT (OUTPATIENT)
Dept: CARDIOLOGY | Age: 75
End: 2020-11-10
Payer: MEDICARE

## 2020-11-10 VITALS
HEART RATE: 75 BPM | SYSTOLIC BLOOD PRESSURE: 112 MMHG | WEIGHT: 164 LBS | DIASTOLIC BLOOD PRESSURE: 78 MMHG | HEIGHT: 72 IN | BODY MASS INDEX: 22.21 KG/M2 | OXYGEN SATURATION: 94 %

## 2020-11-10 PROCEDURE — G8484 FLU IMMUNIZE NO ADMIN: HCPCS | Performed by: CLINICAL NURSE SPECIALIST

## 2020-11-10 PROCEDURE — 1123F ACP DISCUSS/DSCN MKR DOCD: CPT | Performed by: CLINICAL NURSE SPECIALIST

## 2020-11-10 PROCEDURE — 99213 OFFICE O/P EST LOW 20 MIN: CPT | Performed by: CLINICAL NURSE SPECIALIST

## 2020-11-10 PROCEDURE — 4004F PT TOBACCO SCREEN RCVD TLK: CPT | Performed by: CLINICAL NURSE SPECIALIST

## 2020-11-10 PROCEDURE — G8420 CALC BMI NORM PARAMETERS: HCPCS | Performed by: CLINICAL NURSE SPECIALIST

## 2020-11-10 PROCEDURE — G8427 DOCREV CUR MEDS BY ELIG CLIN: HCPCS | Performed by: CLINICAL NURSE SPECIALIST

## 2020-11-10 PROCEDURE — 3017F COLORECTAL CA SCREEN DOC REV: CPT | Performed by: CLINICAL NURSE SPECIALIST

## 2020-11-10 PROCEDURE — 4040F PNEUMOC VAC/ADMIN/RCVD: CPT | Performed by: CLINICAL NURSE SPECIALIST

## 2020-11-10 RX ORDER — ACAMPROSATE CALCIUM 333 MG/1
333 TABLET, DELAYED RELEASE ORAL 2 TIMES DAILY
COMMUNITY

## 2020-11-10 RX ORDER — ROSUVASTATIN CALCIUM 10 MG/1
10 TABLET, COATED ORAL DAILY
Qty: 90 TABLET | Refills: 3 | Status: SHIPPED | OUTPATIENT
Start: 2020-11-10 | End: 2021-10-13

## 2020-11-10 RX ORDER — RAMIPRIL 10 MG/1
10 CAPSULE ORAL DAILY
Qty: 90 CAPSULE | Refills: 3 | Status: SHIPPED | OUTPATIENT
Start: 2020-11-10 | End: 2021-11-11

## 2020-11-10 RX ORDER — METOPROLOL SUCCINATE 50 MG/1
TABLET, EXTENDED RELEASE ORAL
Qty: 90 TABLET | Refills: 3 | Status: SHIPPED | OUTPATIENT
Start: 2020-11-10 | End: 2021-10-13

## 2020-11-10 ASSESSMENT — ENCOUNTER SYMPTOMS
ABDOMINAL PAIN: 0
WHEEZING: 0
FACIAL SWELLING: 0
SHORTNESS OF BREATH: 0
COUGH: 0
CHEST TIGHTNESS: 0
VOMITING: 0
EYE REDNESS: 0
NAUSEA: 0

## 2020-11-10 NOTE — PATIENT INSTRUCTIONS
Restart Crestor (rosuvastatin) 10mg daily  Continue Metoprolol 50mg daily in a.m.   Decrease Ramipirl (altace)  To 10mg once daily at bedtime

## 2020-11-10 NOTE — PROGRESS NOTES
[facility] [address]  [address]  Phone: [phone]  Fax: [phone]    OFFICE VISIT:  11/10/2020    Emily Saunders - : 1945    Reason For Visit:  Kaitlynn Barroso is a 76 y.o. male who is here for 1 Month Follow-Up (No cardiac sx today ); Coronary Artery Disease; and Hypertension       Diagnosis Orders   1. Hypotension due to drugs     2. Coronary artery disease involving native coronary artery of native heart with angina pectoris ([address]     3. Essential hypertension  metoprolol succinate (TOPROL XL) 50 MG extended release tablet   4. Alcohol dependence in remission ([address]     5. Mixed hyperlipidemia     6. Closed fracture of right hip, sequela           HPI  Patient is here today with a history of CAD, hypertension, hyperlipidemia. He recently had a fall and a fractured right hip and recently was discharged [facility] the [facility]. He is an alcoholic, and is no longer drinking since hospital discharge. He has lost a significant amount of weight and there was concern about overmedication at last visit. At last visit, we completely stopped nifedipine. He has continued to have some low readings. His daughter has been holding metoprolol if his systolic blood pressure is less than 110 as this was what they were instructed to do at [facility] upon discharge. Patient states he is regaining some strength and is no longer in a wheelchair. SYMONE Jara CNP is PCP.   Emily Saunders has the following [facility] as [facility] in [facility]:    Patient Active Problem List    Diagnosis Date Noted    ACS (acute coronary syndrome) [facility])      Priority: High    Transaminitis 10/08/2020    Closed comminuted intertrochanteric fracture of proximal end of right femur ([address] 2020    Alcohol dependence, continuous ([address] 2020    Alcohol withdrawal hallucinosis ([facility] 2020    Coronary artery disease involving native coronary artery of native heart without angina pectoris 2016    Smoker     Myocardial infarction [facility])     Hyperlipidemia     Essential hypertension      Past Medical History:   Diagnosis Date    CAD (coronary artery disease)     Hyperlipidemia     Dr. Maico Bowen Hypertension     subendocardial    Myocardial infarction ([facility]     Tobacco abuse      Past Surgical History:   Procedure Laterality Date    CARDIAC CATHETERIZATION      EF in excess of 50%    CARDIAC CATHETERIZATION  2016    Two bare metal stents to right coronary artery.   Porsha Danielle M.D.   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\"    Other Son         paralyzed s/p MVA, then    [facility] No Known Problems Son     Drug Abuse Daughter     Colon Cancer Neg Hx     Stomach Cancer Neg Hx     Liver Cancer Neg Hx     Esophageal Cancer Neg Hx      Social History     Tobacco Use    Smoking status: Former Smoker     Packs/day: 25.00     Years: 1.00     Pack years: 25.00     Types: Cigarettes     Last attempt to quit: 2012     Years since quittin.5    Smokeless tobacco: Current User     Types: Chew    Tobacco comment: chews a can a day,    Substance Use Topics    Alcohol use: Not Currently     Comment: None since 2020      Current Outpatient Medications   Medication Sig Dispense Refill    acamprosate (CAMPRAL) 333 MG tablet Take 333 mg by mouth 2 times daily      Encounters:   11/10/20 164 lb (74.4 kg)   10/08/20 167 lb (75.8 kg)   10/05/20 166 lb (75.3 kg)        BP Readings from Last 3 Encounters:   11/10/20 112/78   10/08/20 114/74   10/05/20 92/62    Pulse Readings from Last 3 Encounters:   11/10/20 75   10/08/20 92   10/05/20 76        Physical Exam  Vitals signs and nursing note reviewed. Constitutional:       General: He is not in acute distress. Appearance: He is well-developed. He is not diaphoretic. Comments: Frail, deconditioned   HENT:      Head: Normocephalic and atraumatic. Right Ear: Hearing and external ear normal.      Left Ear: Hearing and external ear normal.      Nose: Nose normal.   Eyes:      General:         Right eye: No discharge. Left eye: No discharge. Pupils: Pupils are equal, round, and reactive to light. Neck:      Musculoskeletal: Neck supple. No muscular tenderness. Thyroid: No thyromegaly. Vascular: No carotid bruit or JVD. Trachea: No tracheal deviation. Cardiovascular:      Rate and Rhythm: Normal rate and regular rhythm. Heart sounds: Normal heart sounds. No murmur. No friction rub. No gallop. Pulmonary:      Effort: Pulmonary effort is normal. No respiratory distress. Breath sounds: Normal breath sounds. No wheezing or rales. Abdominal:      Palpations: Abdomen is soft. Tenderness: There is no abdominal tenderness. Musculoskeletal:         General: No swelling or deformity. Skin:     General: Skin is warm and dry. Findings: No rash. Neurological:      General: No focal deficit present. Mental Status: He is alert and oriented to person, place, and time. Cranial Nerves: No cranial nerve deficit.    Psychiatric:         Mood and Affect: Mood normal.         Behavior: Behavior normal.         Judgment: Judgment normal.         Data:  Lab Results   Component Value Date    CHOL 152 (L) 10/05/2020    TRIG 116 10/05/2020    HDL 47 (L) 10/05/2020    LDLCALC 82

## 2021-03-30 ENCOUNTER — TELEPHONE (OUTPATIENT)
Dept: CARDIOLOGY CLINIC | Age: 76
End: 2021-03-30

## 2021-03-30 NOTE — TELEPHONE ENCOUNTER
Date: 4/30/21    Cardiologist: Narda Boyd    Procedure: colonoscopy    Surgeon: Niraj Arita    Last Office Visit: 11/10/20  Reason for office visit and medical concerns addressed at this office visit: cad, htn, hyperlipidemia    Testing Performed and Date of Service:  5/10/17Cat  Conclusions      1. Severe 1V CAD of the RCA with in stent restenosis   2. Successful PCI of the RCA with 3 RO      Recommendations      Routine post cath care   Optimize medical management for CAD   Aggressive risk factor modification    Does the patient have a stent? If so, what type? RO 2017    Current Medications: campral, toprol, ramipril, crestor, librium, abilify, lexapro, aspirin    Is the patient currently taking an anticoagulant? If so, what is the diagnosis the patient has been given to warrant the need for the anticoagulant? none    Additional Notes: requesting to hold aspirin 7 days and xarelto 3 days prior to procedure but I do not see that patient is taking xarelto.

## 2021-03-30 NOTE — TELEPHONE ENCOUNTER
Okay to hold aspirin 7 days prior to procedure. I am not sure why he is on Xarelto and who prescribed it, but I do not think he is on it for cardiac reason. They would be to contact his PCPs office to clarify why he is on Xarelto.

## 2021-05-12 ENCOUNTER — OFFICE VISIT (OUTPATIENT)
Dept: CARDIOLOGY CLINIC | Age: 76
End: 2021-05-12
Payer: MEDICARE

## 2021-05-12 VITALS
BODY MASS INDEX: 22.21 KG/M2 | HEIGHT: 72 IN | SYSTOLIC BLOOD PRESSURE: 124 MMHG | HEART RATE: 58 BPM | WEIGHT: 164 LBS | DIASTOLIC BLOOD PRESSURE: 84 MMHG

## 2021-05-12 DIAGNOSIS — I10 ESSENTIAL HYPERTENSION: Primary | ICD-10-CM

## 2021-05-12 DIAGNOSIS — I25.119 CORONARY ARTERY DISEASE INVOLVING NATIVE CORONARY ARTERY OF NATIVE HEART WITH ANGINA PECTORIS (HCC): ICD-10-CM

## 2021-05-12 DIAGNOSIS — I25.2 HISTORY OF MI (MYOCARDIAL INFARCTION): ICD-10-CM

## 2021-05-12 DIAGNOSIS — I24.9 ACS (ACUTE CORONARY SYNDROME) (HCC): ICD-10-CM

## 2021-05-12 PROCEDURE — 3017F COLORECTAL CA SCREEN DOC REV: CPT | Performed by: INTERNAL MEDICINE

## 2021-05-12 PROCEDURE — G8427 DOCREV CUR MEDS BY ELIG CLIN: HCPCS | Performed by: INTERNAL MEDICINE

## 2021-05-12 PROCEDURE — 99214 OFFICE O/P EST MOD 30 MIN: CPT | Performed by: INTERNAL MEDICINE

## 2021-05-12 PROCEDURE — 93000 ELECTROCARDIOGRAM COMPLETE: CPT | Performed by: INTERNAL MEDICINE

## 2021-05-12 PROCEDURE — 4040F PNEUMOC VAC/ADMIN/RCVD: CPT | Performed by: INTERNAL MEDICINE

## 2021-05-12 PROCEDURE — 4004F PT TOBACCO SCREEN RCVD TLK: CPT | Performed by: INTERNAL MEDICINE

## 2021-05-12 PROCEDURE — 1123F ACP DISCUSS/DSCN MKR DOCD: CPT | Performed by: INTERNAL MEDICINE

## 2021-05-12 PROCEDURE — G8420 CALC BMI NORM PARAMETERS: HCPCS | Performed by: INTERNAL MEDICINE

## 2021-05-12 NOTE — PROGRESS NOTES
HISTORY  68-year-old gentleman with a history of prior tobacco abuse/COPD, dyslipidemia, hypertension, past alcohol abuse, and coronary disease returns for follow-up. 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 In 2017 angiographic restudy revealed a 50% proximal LAD and a subtotal in-stent stenosis prompting placement of 2 additional stents in his right coronary. He has previously revealed severe dyslipidemia with an LDL in excess of 200 while off statin therapy with good control on 20 of Crestor with an LDL of 77 noted in February 2020. In September 2020 he sustained a hip fracture since which time he reports no alcohol intake. On return today he denies any recurrent chest or arm discomfort which represented his previous angina. He has been vaccinated for COVID-19. PHYSICAL EXAM  On exam he carries 164 pounds in a 6 foot frame. Pressure is 124/84 with a pulse of 58. EOMs full, sclerae and conjunctiva normal. PERRLA. Mask in place. Trachea midline with no neck masses. Assessment of internal jugular veins reveals no elevation of central venous pressure at 45 degrees. Carotid pulses normal without delay or bruit. Thyroid normal to palpation. Chest exam reveals normal respiratory effort, no abnormal breath sounds and normal expiratory phase. No skin lesions seen. PMI normal. S1, S2 normal without murmur or micheal or click. Normal bowel sounds without palpable mass or bruit. No clubbing or acrocyanosis. No significant lower extremity edema or signs of venous insufficiency. General motor strength appears to be within normal limits. Normal range of motion with normal gait. Alert, oriented x 3, memory and cognition normal as reflected by history and conversation. EKG reveals sinus bradycardia at 58 without abnormalities. ASSESSMENT/PLAN:   1. Coronary disease -previously demonstrated restenosis it is now out 4 years from 2 additional stents and address of such.  Will order a dobutamine stress echo. Has nitroglycerin prescription  2. Dyslipidemia -October 2020 values LDL 82, HDL 47, triglycerides 116. Good control we will continue present dose of Crestor 10 mg  3. Hypertension -good control we will continue 10 of Altase and 50 of metoprolol succinate  4.  Pandemic response -appropriate/vaccinated

## 2021-05-12 NOTE — PATIENT INSTRUCTIONS
Patient's contact number:  [phone] (home)     Date/Time:     Dobutamine Stress Test    A chemical stress test uses chemical agents injected into the body through the vein. These chemicals make the heart function as if it were under stress. A chemical stress test is used when a traditional stress test (called a cardiac stress test) cannot be done. Testing will take approximately one hour. Dobutamine Stress Echocardiogram Instructions:   No caffeine 24 hours prior to the testing. This includes: coffee, pop/soda, chocolate, cold medications, etc.  Any product that might contain caffeine. Do not eat or drink anything, except water, eight (8) hours before the test.   No alcohol or nicotine twelve (12) hours prior to your test.   Wear comfortable clothing. If you are taking metoprolol, stop morning of this procedure.

## 2021-06-09 ENCOUNTER — VIRTUAL VISIT (OUTPATIENT)
Dept: CARDIOLOGY CLINIC | Age: 76
End: 2021-06-09

## 2021-06-09 DIAGNOSIS — I10 ESSENTIAL HYPERTENSION: Primary | ICD-10-CM

## 2021-06-18 DIAGNOSIS — I24.9 ACS (ACUTE CORONARY SYNDROME) (HCC): ICD-10-CM

## 2021-06-18 DIAGNOSIS — I25.2 HISTORY OF MI (MYOCARDIAL INFARCTION): ICD-10-CM

## 2021-06-18 DIAGNOSIS — I25.119 CORONARY ARTERY DISEASE INVOLVING NATIVE CORONARY ARTERY OF NATIVE HEART WITH ANGINA PECTORIS (HCC): ICD-10-CM

## 2021-10-12 DIAGNOSIS — I10 ESSENTIAL HYPERTENSION: ICD-10-CM

## 2021-10-13 RX ORDER — METOPROLOL SUCCINATE 50 MG/1
TABLET, EXTENDED RELEASE ORAL
Qty: 90 TABLET | Refills: 3 | Status: SHIPPED | OUTPATIENT
Start: 2021-10-13 | End: 2022-10-13 | Stop reason: SDUPTHER

## 2021-10-13 RX ORDER — ROSUVASTATIN CALCIUM 10 MG/1
TABLET, COATED ORAL
Qty: 90 TABLET | Refills: 0 | Status: SHIPPED | OUTPATIENT
Start: 2021-10-13 | End: 2022-01-25

## 2021-11-11 RX ORDER — RAMIPRIL 10 MG/1
CAPSULE ORAL
Qty: 90 CAPSULE | Refills: 1 | Status: SHIPPED | OUTPATIENT
Start: 2021-11-11 | End: 2022-04-27

## 2022-01-25 RX ORDER — ROSUVASTATIN CALCIUM 10 MG/1
TABLET, COATED ORAL
Qty: 30 TABLET | Refills: 0 | Status: SHIPPED | OUTPATIENT
Start: 2022-01-25 | End: 2022-04-01

## 2022-02-28 RX ORDER — ROSUVASTATIN CALCIUM 10 MG/1
TABLET, COATED ORAL
Qty: 30 TABLET | Refills: 0 | OUTPATIENT
Start: 2022-02-28

## 2022-03-09 ENCOUNTER — TELEPHONE (OUTPATIENT)
Dept: CARDIOLOGY CLINIC | Age: 77
End: 2022-03-09

## 2022-03-09 NOTE — TELEPHONE ENCOUNTER
.Date: 4/18/22    Cardiologist: Emily Collazo    Procedure: EGD/Colon    Surgeon: Sreedhar/Karina    Last Office Visit: 6/9/21  Reason for office visit and medical concerns addressed at this office visit: cad, htn, hyperlipdiemia    Testing Performed and Date of Service:  5/10/17 Cath  1. Severe 1V CAD of the RCA with in stent restenosis   2. Successful PCI of the RCA with 3 RO    RCRI = 0.9  METs 4c    Current Medications: crestor, ramipril, metoprolol, campral, librium, abilify, lexapro,     Is the patient currently taking an anticoagulant?  If so, what is the diagnosis the patient has been given to warrant the need for the anticoagulant? none    Additional Notes: requesting cardiac clearance prior to procedure

## 2022-03-10 NOTE — TELEPHONE ENCOUNTER
ASSESSMENT/PLAN:   1. Coronary disease -previously demonstrated restenosis it is now out 4 years from 2 additional stents and address of such. Will order a dobutamine stress echo. Has nitroglycerin prescription  2. Dyslipidemia -October 2020 values LDL 82, HDL 47, triglycerides 116. Good control we will continue present dose of Crestor 10 mg  3. Hypertension -good control we will continue 10 of Altase and 50 of metoprolol succinate  4. Pandemic response -appropriate/vaccinated    Patient No showed to Dobutamine stress test x 2.

## 2022-03-16 NOTE — TELEPHONE ENCOUNTER
Test was done at [facility] and was not seen at first. He is cleared for surgery per Dr. Shwetha Levine.

## 2022-03-21 RX ORDER — ROSUVASTATIN CALCIUM 10 MG/1
TABLET, COATED ORAL
Qty: 30 TABLET | Refills: 0 | OUTPATIENT
Start: 2022-03-21

## 2022-04-01 RX ORDER — ROSUVASTATIN CALCIUM 10 MG/1
TABLET, COATED ORAL
Qty: 30 TABLET | Refills: 1 | Status: SHIPPED | OUTPATIENT
Start: 2022-04-01 | End: 2022-05-19

## 2022-04-19 RX ORDER — RAMIPRIL 10 MG/1
CAPSULE ORAL
Qty: 90 CAPSULE | Refills: 1 | OUTPATIENT
Start: 2022-04-19

## 2022-04-27 RX ORDER — RAMIPRIL 10 MG/1
CAPSULE ORAL
Qty: 90 CAPSULE | Refills: 0 | Status: SHIPPED | OUTPATIENT
Start: 2022-04-27 | End: 2022-10-13 | Stop reason: SDUPTHER

## 2022-05-20 RX ORDER — ROSUVASTATIN CALCIUM 10 MG/1
TABLET, COATED ORAL
Qty: 30 TABLET | Refills: 0 | Status: SHIPPED | OUTPATIENT
Start: 2022-05-20 | End: 2022-10-17 | Stop reason: SDUPTHER

## 2022-06-27 RX ORDER — ROSUVASTATIN CALCIUM 10 MG/1
TABLET, COATED ORAL
Qty: 30 TABLET | Refills: 0 | OUTPATIENT
Start: 2022-06-27

## 2022-07-19 RX ORDER — ROSUVASTATIN CALCIUM 10 MG/1
TABLET, COATED ORAL
Qty: 30 TABLET | Refills: 2 | OUTPATIENT
Start: 2022-07-19

## 2022-08-01 RX ORDER — RAMIPRIL 10 MG/1
CAPSULE ORAL
Qty: 90 CAPSULE | Refills: 0 | OUTPATIENT
Start: 2022-08-01

## 2022-08-22 RX ORDER — RAMIPRIL 10 MG/1
CAPSULE ORAL
Qty: 90 CAPSULE | Refills: 0 | OUTPATIENT
Start: 2022-08-22

## 2022-09-21 RX ORDER — RAMIPRIL 10 MG/1
CAPSULE ORAL
Qty: 90 CAPSULE | Refills: 0 | OUTPATIENT
Start: 2022-09-21

## 2022-10-13 DIAGNOSIS — I10 ESSENTIAL HYPERTENSION: ICD-10-CM

## 2022-10-13 RX ORDER — RAMIPRIL 10 MG/1
CAPSULE ORAL
Qty: 30 CAPSULE | Refills: 0 | Status: SHIPPED | OUTPATIENT
Start: 2022-10-13 | End: 2022-10-21

## 2022-10-13 RX ORDER — METOPROLOL SUCCINATE 50 MG/1
50 TABLET, EXTENDED RELEASE ORAL DAILY
Qty: 30 TABLET | Refills: 0 | Status: SHIPPED | OUTPATIENT
Start: 2022-10-13 | End: 2022-11-01

## 2022-10-17 ENCOUNTER — OFFICE VISIT (OUTPATIENT)
Dept: CARDIOLOGY CLINIC | Age: 77
End: 2022-10-17
Payer: MEDICARE

## 2022-10-17 VITALS
WEIGHT: 177 LBS | HEIGHT: 73 IN | SYSTOLIC BLOOD PRESSURE: 134 MMHG | DIASTOLIC BLOOD PRESSURE: 84 MMHG | BODY MASS INDEX: 23.46 KG/M2 | HEART RATE: 58 BPM

## 2022-10-17 DIAGNOSIS — I25.2 HISTORY OF MI (MYOCARDIAL INFARCTION): ICD-10-CM

## 2022-10-17 DIAGNOSIS — I25.119 CORONARY ARTERY DISEASE INVOLVING NATIVE CORONARY ARTERY OF NATIVE HEART WITH ANGINA PECTORIS (HCC): Primary | ICD-10-CM

## 2022-10-17 DIAGNOSIS — E78.2 MIXED HYPERLIPIDEMIA: ICD-10-CM

## 2022-10-17 DIAGNOSIS — I10 ESSENTIAL HYPERTENSION: ICD-10-CM

## 2022-10-17 DIAGNOSIS — Z95.5 HISTORY OF CORONARY ARTERY STENT PLACEMENT: ICD-10-CM

## 2022-10-17 PROCEDURE — 1123F ACP DISCUSS/DSCN MKR DOCD: CPT | Performed by: NURSE PRACTITIONER

## 2022-10-17 PROCEDURE — G8427 DOCREV CUR MEDS BY ELIG CLIN: HCPCS | Performed by: NURSE PRACTITIONER

## 2022-10-17 PROCEDURE — 99214 OFFICE O/P EST MOD 30 MIN: CPT | Performed by: NURSE PRACTITIONER

## 2022-10-17 PROCEDURE — 4004F PT TOBACCO SCREEN RCVD TLK: CPT | Performed by: NURSE PRACTITIONER

## 2022-10-17 PROCEDURE — G8420 CALC BMI NORM PARAMETERS: HCPCS | Performed by: NURSE PRACTITIONER

## 2022-10-17 PROCEDURE — G8484 FLU IMMUNIZE NO ADMIN: HCPCS | Performed by: NURSE PRACTITIONER

## 2022-10-17 PROCEDURE — 93000 ELECTROCARDIOGRAM COMPLETE: CPT | Performed by: NURSE PRACTITIONER

## 2022-10-17 RX ORDER — ROSUVASTATIN CALCIUM 10 MG/1
TABLET, COATED ORAL
Qty: 90 TABLET | Refills: 1 | Status: SHIPPED | OUTPATIENT
Start: 2022-10-17

## 2022-10-17 RX ORDER — RAMIPRIL 10 MG/1
CAPSULE ORAL
Qty: 30 CAPSULE | Refills: 0 | OUTPATIENT
Start: 2022-10-17

## 2022-10-17 RX ORDER — NITROGLYCERIN 0.4 MG/1
TABLET SUBLINGUAL
Qty: 25 TABLET | Refills: 3 | Status: SHIPPED | OUTPATIENT
Start: 2022-10-17

## 2022-10-17 NOTE — PATIENT INSTRUCTIONS
New instructions for today:  How to take:  NITROGLYCERIN (Nitrostat) 0.4 mg tablets, sublingual.  Nitroglycerin is in a group of drugs called nitrates. Nitroglycerin dilates (widens) blood vessels, making it easier for blood to flow through them and easier for the heart to pump. Dosing Guidelines for Nitroglycerin Tablets  At the start of an angina (chest pain) attack, place one tablet under the tongue or between the cheek and gum. Do not swallow or chew the tablet; let it dissolve on its own. If necessary, a second and third tablet may be used, with five minutes between using each tablet. If you use a third tablet and your chest pain continues, it is time to seek immediate medical attention. Call 911 immediately and have someone drive you to the emergency room. You may be having a heart attack or other serious heart problem. To prevent angina from exercise or stress, use 1 tablet 5 to 10 minutes before the activity. Itr      Patient Instructions:  Continue current medications as prescribed. Always keep a current medication list. Bring your medications to every office visit. Continue to follow up with primary care provider for non cardiac medical problems. Call the office with any problems, questions or concerns at [phone]. If you have been asked to keep a blood pressure log, do so for 2 weeks. Call the office to report readings to the triage nurse at [phone]. Follow up with cardiologist as scheduled. The following educational material has been included in this after visit summary for your review: Life simple 7. Heart health. Life simple 7  1) Manage blood pressure - high blood pressure is a major risk factor for heart disease and stroke. Keeping blood pressure in health range reduces strain on your heart, arteries and kidneys. Blood pressure goal is less than 130/80. 2) Control cholesterol - contributes to plaque, which can clog arteries and lead to heart disease and stroke. When you control your cholesterol you are giving your arteries their best chance to remain clear. It is recommended that you get cholesterol lab work done once a year. 3) Reduce blood sugar - most of the food we eat is turning into glucose or blood sugar that our body uses for energy. Over time, high levels of blood sugar can damage your heart, kidneys, eyes and nerves. 4) Get active - living an active life is one of the most rewarding gifts you can give yourself and those you love. Simply put, daily physical activity increases your length and quality of life. Strive to exercise 15 minutes most days of the week. 5)  Eat better - A healthy diet is one of your best weapons for fighting cardiovascular disease. When you eat a heart healthy diet, you improve your chances for feeling good and staying healthy for life. 6)  Lose weight - when you shed extra fat an unnecessary pounds, you reduce the burden on your hear, lungs, blood vessels and skeleton. You give yourself the gift of active living, you lower your blood pressure and help yourself feel better. 7) Stop smoking - cigarette smokers have a higher risk of developing cardiovascular disease. If  You smoke, quitting is the best thing you can do for your health. Check American Heart Association on line for more information on Life's Simple 7 and tips for healthy living. A Healthy Heart: Care Instructions  Your Care Instructions     Coronary artery disease, also called heart disease, occurs when a substance called plaque builds up in the vessels that supply oxygen-rich blood to your heart muscle. This can narrow the blood vessels and reduce blood flow. A heart attack happens when blood flow is completely blocked. A high-fat diet, smoking, and other factors increase the risk of heart disease. Your doctor has found that you have a chance of having heart disease. You can do lots of things to keep your heart healthy.  It may not be easy, but you can change your diet, exercise more, and quit smoking. These steps really work to lower your chance of heart disease. Follow-up care is a key part of your treatment and safety. Be sure to make and go to all appointments, and call your doctor if you are having problems. It's also a good idea to know your test results and keep a list of the medicines you take. How can you care for yourself at home? Diet  Use less salt when you cook and eat. This helps lower your blood pressure. Taste food before salting. Add only a little salt when you think you need it. With time, your taste buds will adjust to less salt. Eat fewer snack items, fast foods, canned soups, and other high-salt, high-fat, processed foods. Read food labels and try to avoid saturated and trans fats. They increase your risk of heart disease by raising cholesterol levels. Limit the amount of solid fat-butter, margarine, and shortening-you eat. Use olive, peanut, or canola oil when you cook. Bake, broil, and steam foods instead of frying them. Eat a variety of fruit and vegetables every day. Dark green, deep orange, red, or yellow fruits and vegetables are especially good for you. Examples include spinach, carrots, peaches, and berries. Foods high in fiber can reduce your cholesterol and provide important vitamins and minerals. High-fiber foods include whole-grain cereals and breads, oatmeal, beans, brown rice, citrus fruits, and apples. Eat lean proteins. Heart-healthy proteins include seafood, lean meats and poultry, eggs, beans, peas, nuts, seeds, and soy products. Limit drinks and foods with added sugar. These include candy, desserts, and soda pop. Lifestyle changes  If your doctor recommends it, get more exercise. Walking is a good choice. Bit by bit, increase the amount you walk every day. Try for at least 30 minutes on most days of the week. You also may want to swim, bike, or do other activities. Do not smoke.  If you need help quitting, talk to your doctor about stop-smoking programs and medicines. These can increase your chances of quitting for good. Quitting smoking may be the most important step you can take to protect your heart. It is never too late to quit. Limit alcohol to 2 drinks a day for men and 1 drink a day for women. Too much alcohol can cause health problems. Manage other health problems such as diabetes, high blood pressure, and high cholesterol. If you think you may have a problem with alcohol or drug use, talk to your doctor. Medicines  Take your medicines exactly as prescribed. Call your doctor if you think you are having a problem with your medicine. If your doctor recommends aspirin, take the amount directed each day. Make sure you take aspirin and not another kind of pain reliever, such as acetaminophen (Tylenol). When should you call for help? [id number] if you have symptoms of a heart attack. These may include:  Chest pain or pressure, or a strange feeling in the chest.  Sweating. Shortness of breath. Pain, pressure, or a strange feeling in the back, neck, jaw, or upper belly or in one or both shoulders or arms. Lightheadedness or sudden weakness. A fast or irregular heartbeat. After you call 911, the  may tell you to chew 1 adult-strength or 2 to 4 low-dose aspirin. Wait for an ambulance. Do not try to drive yourself. Watch closely for changes in your health, and be sure to contact your doctor if you have any problems. Where can you learn more? Go to https://DN[employer] [email or web address].Yurpy. org and sign in to your [facility] account. Enter E931 in the [facility] box to learn more about \"A Healthy Heart: Care Instructions. \"     If you do not have an account, please click on the \"Sign Up Now\" link. Current as of: December 16, 2019               Content Version: 12.5  © 5780-4576 Healthwise, Incorporated. Care instructions adapted under license by [facility] ([facility]).  If you have questions about a medical condition or this instruction, always ask your healthcare professional. [place] [address] any warranty or liability for your use of this information.

## 2022-10-17 NOTE — PROGRESS NOTES
[facility], [place]. [address] [facility]  [phone] office  [phone] fax      OFFICE VISIT:  10/17/2022    Z[place] : 1945  Reason For Visit:  Gustabo Bolton is a 68 y.o. male who is here for Follow-up (No cardiac symptoms) and Hypertension    History:   Diagnosis Orders   1. Coronary artery disease involving native coronary artery of native heart with angina pectoris ([address]        2. Essential hypertension  EKG 12 lead      3. Mixed hyperlipidemia        4. History of MI (myocardial infarction)        5. History of coronary artery stent placement       Severe 1V CAD of the RCA with in stent restenosis. Successful PCI of the RCA with 3 RO - Dr. Linden Troncoso        The patient presents today for cardiology follow up. The patient reports doing very well without cardiac relates issues. He stopped smoking 15 years ago. He reports no decline in activity tolerance. The patient had a cath on 5/10/17 revealing severe 1 V CAD with in stent restenosis. The patient had successfwful PCI of RCA with 3 RO by Dr. Markos Powell. The patient denies symptoms to suggest myocardial ischemia, heart failure or arrhythmia. BP is we;; controlled on current regimen. The patient's PCP monitors cholesterol. Subjective  Gustabo Bolton denies exertional chest pain, shortness of breath, orthopnea, paroxysmal nocturnal dyspnea, syncope, presyncope, sensed arrhythmia, edema and fatigue. The patient denies numbness or weakness to suggest cerebrovascular accident or transient ischemic attack.       Cee Haddad has the following history as recorded in [facility]:  Patient Active Problem List   Diagnosis Code    Smoker F17.200    Myocardial infarction 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 Simran Barrientos    Hypertension     subendocardial    Myocardial infarction [facility])     Tobacco abuse      Past Surgical History:   Procedure Laterality Date    CARDIAC CATHETERIZATION      EF in excess of 50%    CARDIAC CATHETERIZATION  2016    Two bare metal stents to right coronary artery.   Willis Villa M.D.    COLONOSCOPY      \"Normal\" per patient     CORONARY ANGIOPLASTY WITH [address] CATH LAB PROCEDURE      HERNIA REPAIR      multiple: 02 right groin, had done both sides ~    HIP SURGERY Right 2020    HIP HEMIARTHROPLASTY performed by Jennifer Faria MD at [facility]      as a child     Family History   Problem Relation Age of Onset    Heart Surgery Brother         stent placement, CABG    Other Brother         uses tobacco    Other Mother          MVA    Emphysema Father         former smoker, had worked in a \"stove plant\"    Other Son         paralyzed s/p MVA, then     No Known Problems Son     Drug Abuse Daughter     Colon Cancer Neg Hx     Stomach Cancer Neg Hx     Liver Cancer Neg Hx     Esophageal Cancer Neg Hx      Social History     Tobacco Use    Smoking status: Former     Packs/day: 25.00     Years: 1.00     Pack years: 25.00     Types: Cigarettes     Quit date: 2012     Years since quitting: 10.4    Smokeless tobacco: Current     Types: Chew    Tobacco comments:     chews a can a day,    Substance Use Topics    Alcohol use: Not Currently     Comment: None since 2020      Current Outpatient Medications   Medication Sig Dispense Refill    nitroGLYCERIN (NITROSTAT) 0.4 MG SL tablet Dissolve 1 tablet under tongue for chest pain and repeat every 5 min up to max of 3 total doses. If no relief after 3 doses call 911 25 tablet 3    ramipril (ALTACE) 10 MG capsule TAKE ONE CAPSULE EVERY DAY 30 capsule 0    metoprolol succinate (TOPROL XL) 50 MG extended release tablet Take 1 tablet by mouth daily 30 tablet 0    rosuvastatin (CRESTOR) 10 MG tablet TAKE ONE TABLET EVERY DAY 30 tablet 0    COVID-19 mRNA Virus Vaccine (MODERNA COVID-19 VACCINE IM) Inject into the muscle Both doses      acamprosate (CAMPRAL) 333 MG tablet Take 333 mg by mouth 2 times daily      chlordiazePOXIDE (LIBRIUM) 5 MG capsule Take 5 mg by mouth 2 times daily. ARIPiprazole (ABILIFY) 2 MG tablet Take 2 mg by mouth daily      escitalopram (LEXAPRO) 20 MG tablet Take 20 mg by mouth daily      fish oil-omega-3 fatty acids 1000 MG capsule Take 1,000 mg by mouth daily        No current facility-administered medications for this visit. Allergies: Patient has no known allergies. Review of Systems  Constitutional - no appetite change, or unexpected weight change. No fever, chills or diaphoresis. No significant change in activity level or new onset of fatigue. HEENT - no significant rhinorrhea or epistaxis. No tinnitus or significant hearing loss. Eyes - no sudden vision change or amaurosis. No corneal arcus, xantholasma, subconjunctival hemorrhage or discharge. Respiratory - no significant wheezing, stridor, apnea or cough. No dyspnea on exertion or shortness of air. Cardiovascular - no exertional chest pain to suggest myocardial ischemia. No orthopnea or PND. No sensation of sustained arrythmia. No occurrence of slow heart rate. No palpitations. No claudication. Gastrointestinal - no abdominal swelling or pain. No blood in stool. No severe constipation, diarrhea, nausea, or vomiting. Genitourinary - no dysuria, frequency, or urgency. No flank pain or hematuria.    Musculoskeletal - no back pain or myalgia. No problems with gait. Extremities - no clubbing, cyanosis or extremity edema. Skin - no color change or rash. No pallor. No new surgical incision. Neurologic - no speech difficulty, facial asymmetry or lateralizing weakness. No seizures, presyncope or syncope. No significant dizziness. Hematologic - no easy bruising or excessive bleeding. Psychiatric - no severe anxiety or insomnia. No confusion. All other review of systems are negative. Objective  Vital Signs - /84   Pulse 58   Ht 6' 1\" (1.854 m)   Wt 177 lb (80.3 kg)   BMI 23.35 kg/m²   General - Willeen Silk is alert, cooperative, and pleasant. Well groomed. No acute distress. Body habitus - Body mass index is 23.35 kg/m². HEENT - Head is normocephalic. No circumoral cyanosis. Dentition is normal.  EYES -   Lids normal without ptosis. No discharge, edema or subconjunctival hemorrhage. Neck - Symmetrical without apparent mass or lymphadenopathy. Respiratory - Normal respiratory effort without use of accessory muscles. Ausculatation reveals vesicular breath sounds without crackles, wheezes, rub or rhonchi. Cardiovascular - No jugular venous distention. Auscultation reveals regular rate and rhythm. No audible clicks, gallop or rub. No murmur. No lower extremity varicosities. No carotid bruits. Abdominal -  No visible distention, mass or pulsations. Extremities - No clubbing or cyanosis. No statis dermatitis or ulcers. No edema. Musculoskeletal -   No Osler's nodes. No kyphosis or scoliosis. Gait is even and regular without limp or shuffle. Ambulates without assistance. Skin -  Warm and dry; no rash or pallor. No new surgical wound. Neurological - No focal neurological deficits. Thought processes coherent. No apparent tremor. Oriented to person, place and time. Psychiatric -  Appropriate affect and mood.      Data reviewed:  6/18/21 - normal DSE done at [facility] - see scanned under media     5/10/17 1. Severe 1V CAD of the RCA with in stent restenosis   2. Successful PCI of the RCA with 3 RO    Recommendations    Routine post cath care   Optimize medical management for CAD   Aggressive risk factor modification    Electronically signed by Shayan Arizmendi MD(Performing Physician)   on 05/24/2017 07:54    Angiographic Findings    Cardiac Arteries and Lesion Findings   LMCA: The left main arises from the L coronary cusp, bifurcates into the LAD  and LCX, and is angiographically normal.   LAD: The Left Anterior Descending is a large size vessel that supplies diagonal branches and wraps around the apex. There 50% stenosis of the mid LAD at the junction of the first diagonal. The rest of the vessel shows mild luminal irregularities. LCx: The Circumflex Artery is a moderate size vessel that supplies obtuse marginal branches. There are mild luminal regularities from the vessel. RCA: The right coronary artery arises from the R coronary cusp. It is a dominant system. The vessel is mildly calcified specifically in the proximal segment. Angiographically, the vessel has moderate diffuse disease in the early mid segment with a 50% tubular stenosis and a 99% in-stent restenosis of the mid RCA. Highly tortuous vessel. The rest of vessel has the rest of  the vessel including the RPDA has mild luminal irregularities . EKG today reviewed: NSR 58 bpm; no acute ischemic changes or ectopy. BP Readings from Last 3 Encounters:   10/17/22 134/84   05/12/21 124/84   11/10/20 112/78    Pulse Readings from Last 3 Encounters:   10/17/22 58   05/12/21 58   11/10/20 75        Wt Readings from Last 3 Encounters:   10/17/22 177 lb (80.3 kg)   05/12/21 164 lb (74.4 kg)   11/10/20 164 lb (74.4 kg)     Assessment/Plan:   Diagnosis Orders   1. Coronary artery disease involving native coronary artery of native heart with angina pectoris ([address]        2. Essential hypertension  EKG 12 lead      3. Mixed hyperlipidemia        4.  History of MI (myocardial infarction)        5. History of coronary artery stent placement      5/17 Severe 1V CAD of the RCA with in stent restenosis. Successful PCI of the RCA with 3 RO - Dr. Pasquale Hicks        Stable CV status without overt heart failure, sensed arrhythmia or angina. CAD - stable on current medical management. Continue same. 6/28/21 DSE negative for ischemia. HTN - normotensive on current regimen. BP today 134/84. Continue same. Hyperlipidemia - monitored and managed by PCP. Release signed to obtain recent labs from PCP for review. Continues on Crestor. Patient is compliant with medication regimen. Previous cardiac history and records reviewed. Continue current medical management for cardiac related condition. Continue other current medications as directed. Continue to follow up with primary care provider for non cardiac medical problems. If your primary care provider is outside of the [facility], please request that your labs be faxed to this office at [phone]. BP goal 130/80 or less. Call the office with any problems, questions or concerns at [phone]. Cardiology follow up as scheduled in [address] appointments. Dr. Fadi Nava - 6 months. Educational included in patient instructions. Heart health.       SYMONE Grady

## 2022-10-21 RX ORDER — RAMIPRIL 10 MG/1
CAPSULE ORAL
Qty: 30 CAPSULE | Refills: 5 | Status: SHIPPED | OUTPATIENT
Start: 2022-10-21

## 2022-10-30 NOTE — PLAN OF CARE
Problem: Falls - Risk of:  Goal: Will remain free from falls  Description: Will remain free from falls  9/7/2020 2314 by Latosha Wiseman RN  Outcome: Ongoing     Problem: Falls - Risk of:  Goal: Absence of physical injury  Description: Absence of physical injury  9/7/2020 2314 by Latosha Wiseman RN  Outcome: Ongoing     Problem: Pain:  Goal: Pain level will decrease  Description: Pain level will decrease  9/7/2020 2314 by Latosha Wiseman RN  Outcome: Ongoing     Problem: Pain:  Goal: Control of acute pain  Description: Control of acute pain  9/7/2020 2314 by Latosha Wiseman RN  Outcome: Ongoing     Problem: Pain:  Goal: Control of chronic pain  Description: Control of chronic pain  9/7/2020 2314 by Latosha Wiseman RN  Outcome: Ongoing     Problem: Discharge Planning:  Goal: Discharged to appropriate level of care  Description: Discharged to appropriate level of care  9/7/2020 2314 by Latosha Wiseman RN  Outcome: Ongoing     Problem: Fluid Volume - Deficit:  Goal: Absence of fluid volume deficit signs and symptoms  Description: Absence of fluid volume deficit signs and symptoms  9/7/2020 2314 by Latosha Wiseman RN  Outcome: Ongoing     Problem: Nutrition Deficit:  Goal: Ability to achieve adequate nutritional intake will improve  Description: Ability to achieve adequate nutritional intake will improve  9/7/2020 2314 by Latosha Wiseman RN  Outcome: Ongoing     Problem: Violence - Risk of, Self/Other-Directed:  Goal: Knowledge of developmental care interventions  Description: Absence of violence  9/7/2020 2314 by Latosha Wiseman RN  Outcome: Ongoing
Problem: Pain:  Goal: Pain level will decrease  Description: Pain level will decrease  Outcome: Ongoing  Goal: Control of acute pain  Description: Control of acute pain  Outcome: Ongoing  Goal: Control of chronic pain  Description: Control of chronic pain  Outcome: Ongoing     Problem: Falls - Risk of:  Goal: Will remain free from falls  Description: Will remain free from falls  Outcome: Ongoing  Goal: Absence of physical injury  Description: Absence of physical injury  Outcome: Ongoing     Problem: Discharge Planning:  Goal: Discharged to appropriate level of care  Description: Discharged to appropriate level of care  Outcome: Ongoing     Problem: Fluid Volume - Deficit:  Goal: Absence of fluid volume deficit signs and symptoms  Description: Absence of fluid volume deficit signs and symptoms  Outcome: Ongoing     Problem: Nutrition Deficit:  Goal: Ability to achieve adequate nutritional intake will improve  Description: Ability to achieve adequate nutritional intake will improve  Outcome: Ongoing     Problem: Sleep Pattern Disturbance:  Goal: Appears well-rested  Description: Appears well-rested  Outcome: Ongoing     Problem: Violence - Risk of, Self/Other-Directed:  Goal: Knowledge of developmental care interventions  Description: Absence of violence  Outcome: Ongoing
English

## 2022-10-31 DIAGNOSIS — I10 ESSENTIAL HYPERTENSION: ICD-10-CM

## 2022-11-01 RX ORDER — METOPROLOL SUCCINATE 50 MG/1
TABLET, EXTENDED RELEASE ORAL
Qty: 30 TABLET | Refills: 5 | Status: SHIPPED | OUTPATIENT
Start: 2022-11-01

## 2022-11-15 RX ORDER — ROSUVASTATIN CALCIUM 10 MG/1
TABLET, COATED ORAL
Qty: 90 TABLET | Refills: 1 | OUTPATIENT
Start: 2022-11-15

## 2023-03-20 RX ORDER — ROSUVASTATIN CALCIUM 10 MG/1
TABLET, COATED ORAL
Qty: 90 TABLET | Refills: 1 | OUTPATIENT
Start: 2023-03-20

## 2023-03-23 RX ORDER — RAMIPRIL 10 MG/1
CAPSULE ORAL
Qty: 30 CAPSULE | Refills: 5 | OUTPATIENT
Start: 2023-03-23

## 2023-03-23 RX ORDER — ROSUVASTATIN CALCIUM 10 MG/1
TABLET, COATED ORAL
Qty: 90 TABLET | Refills: 1 | OUTPATIENT
Start: 2023-03-23

## 2023-04-03 RX ORDER — ROSUVASTATIN CALCIUM 10 MG/1
TABLET, COATED ORAL
Qty: 30 TABLET | Refills: 0 | Status: SHIPPED | OUTPATIENT
Start: 2023-04-03

## 2023-04-19 ENCOUNTER — OFFICE VISIT (OUTPATIENT)
Dept: CARDIOLOGY CLINIC | Age: 78
End: 2023-04-19
Payer: MEDICARE

## 2023-04-19 VITALS
HEIGHT: 72 IN | SYSTOLIC BLOOD PRESSURE: 136 MMHG | WEIGHT: 171 LBS | BODY MASS INDEX: 23.16 KG/M2 | DIASTOLIC BLOOD PRESSURE: 82 MMHG | HEART RATE: 51 BPM

## 2023-04-19 DIAGNOSIS — I10 ESSENTIAL HYPERTENSION: ICD-10-CM

## 2023-04-19 DIAGNOSIS — I25.2 HISTORY OF MI (MYOCARDIAL INFARCTION): Primary | ICD-10-CM

## 2023-04-19 PROCEDURE — G8420 CALC BMI NORM PARAMETERS: HCPCS | Performed by: INTERNAL MEDICINE

## 2023-04-19 PROCEDURE — G8427 DOCREV CUR MEDS BY ELIG CLIN: HCPCS | Performed by: INTERNAL MEDICINE

## 2023-04-19 PROCEDURE — 1123F ACP DISCUSS/DSCN MKR DOCD: CPT | Performed by: INTERNAL MEDICINE

## 2023-04-19 PROCEDURE — 3075F SYST BP GE 130 - 139MM HG: CPT | Performed by: INTERNAL MEDICINE

## 2023-04-19 PROCEDURE — 93000 ELECTROCARDIOGRAM COMPLETE: CPT | Performed by: INTERNAL MEDICINE

## 2023-04-19 PROCEDURE — 4004F PT TOBACCO SCREEN RCVD TLK: CPT | Performed by: INTERNAL MEDICINE

## 2023-04-19 PROCEDURE — 99214 OFFICE O/P EST MOD 30 MIN: CPT | Performed by: INTERNAL MEDICINE

## 2023-04-19 PROCEDURE — 3079F DIAST BP 80-89 MM HG: CPT | Performed by: INTERNAL MEDICINE

## 2023-04-19 RX ORDER — DONEPEZIL HYDROCHLORIDE 5 MG/1
5 TABLET, FILM COATED ORAL NIGHTLY
COMMUNITY

## 2023-04-19 RX ORDER — RAMIPRIL 10 MG/1
CAPSULE ORAL
Qty: 30 CAPSULE | Refills: 5 | Status: SHIPPED | OUTPATIENT
Start: 2023-04-19

## 2023-04-19 NOTE — PROGRESS NOTES
abnormalities. ASSESSMENT/PLAN:   Coronary disease -angina free with negative stress testing last obtained in summer 2021. Continue metoprolol and nitroglycerin as needed  Hypertension -well-controlled. Continue metoprolol and ramipril  Dyslipidemia -good control.   Continue Crestor

## 2023-04-20 RX ORDER — METOPROLOL SUCCINATE 50 MG/1
TABLET, EXTENDED RELEASE ORAL
Qty: 30 TABLET | Refills: 5 | Status: SHIPPED | OUTPATIENT
Start: 2023-04-20

## 2023-05-23 RX ORDER — ROSUVASTATIN CALCIUM 10 MG/1
TABLET, COATED ORAL
Qty: 30 TABLET | Refills: 0 | OUTPATIENT
Start: 2023-05-23

## 2023-10-23 ENCOUNTER — OFFICE VISIT (OUTPATIENT)
Dept: CARDIOLOGY CLINIC | Age: 78
End: 2023-10-23
Payer: MEDICARE

## 2023-10-23 VITALS
SYSTOLIC BLOOD PRESSURE: 138 MMHG | DIASTOLIC BLOOD PRESSURE: 92 MMHG | HEIGHT: 73 IN | WEIGHT: 172 LBS | BODY MASS INDEX: 22.8 KG/M2 | HEART RATE: 48 BPM

## 2023-10-23 DIAGNOSIS — E78.2 MIXED HYPERLIPIDEMIA: ICD-10-CM

## 2023-10-23 DIAGNOSIS — I25.119 CORONARY ARTERY DISEASE INVOLVING NATIVE CORONARY ARTERY OF NATIVE HEART WITH ANGINA PECTORIS (HCC): Primary | ICD-10-CM

## 2023-10-23 DIAGNOSIS — I25.2 HISTORY OF MI (MYOCARDIAL INFARCTION): ICD-10-CM

## 2023-10-23 DIAGNOSIS — I10 ESSENTIAL HYPERTENSION: ICD-10-CM

## 2023-10-23 PROCEDURE — 1123F ACP DISCUSS/DSCN MKR DOCD: CPT | Performed by: CLINICAL NURSE SPECIALIST

## 2023-10-23 PROCEDURE — 3080F DIAST BP >= 90 MM HG: CPT | Performed by: CLINICAL NURSE SPECIALIST

## 2023-10-23 PROCEDURE — G8484 FLU IMMUNIZE NO ADMIN: HCPCS | Performed by: CLINICAL NURSE SPECIALIST

## 2023-10-23 PROCEDURE — 99214 OFFICE O/P EST MOD 30 MIN: CPT | Performed by: CLINICAL NURSE SPECIALIST

## 2023-10-23 PROCEDURE — G8420 CALC BMI NORM PARAMETERS: HCPCS | Performed by: CLINICAL NURSE SPECIALIST

## 2023-10-23 PROCEDURE — 4004F PT TOBACCO SCREEN RCVD TLK: CPT | Performed by: CLINICAL NURSE SPECIALIST

## 2023-10-23 PROCEDURE — 3075F SYST BP GE 130 - 139MM HG: CPT | Performed by: CLINICAL NURSE SPECIALIST

## 2023-10-23 PROCEDURE — G8427 DOCREV CUR MEDS BY ELIG CLIN: HCPCS | Performed by: CLINICAL NURSE SPECIALIST

## 2023-10-23 ASSESSMENT — ENCOUNTER SYMPTOMS
WHEEZING: 0
VOMITING: 0
EYE REDNESS: 0
COUGH: 0
FACIAL SWELLING: 0
ABDOMINAL PAIN: 0
CHEST TIGHTNESS: 0
NAUSEA: 0
SHORTNESS OF BREATH: 0

## 2023-10-23 NOTE — PROGRESS NOTES
[facility] [address]  Phone: [phone]  Fax: [phone]    OFFICE VISIT:  10/23/2023    Lady Daly - : 1945    Reason For Visit:  Kathy Javed is a 68 y.o. male who is here for 6 Month Follow-Up and Coronary Artery Disease       Diagnosis Orders   1. Coronary artery disease involving native coronary artery of native heart with angina pectoris ([address])        2. History of MI (myocardial infarction)        3. Essential hypertension        4. Mixed hyperlipidemia              HPI  Patient with history of CAD with a nontransmural myocardial infarction  with 2 bare-metal stents to the RCA. Restudy in May 2017 restented RCA. Last stress test . Other history includes hypertension, hyperlipidemia, previous tobacco use    Patient is active farming and tolerates activity well. He denies chest pain, unusual dyspnea, orthopnea, PND, edema, palpitations. Patient states he has a constant runny nose and wants to know what he can take for congestion     SYMONE Monsivais CNP is PCP.   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 Tonya Prasad    Hypertension     subendocardial    Myocardial infarction [facility])     Tobacco abuse      Past Surgical History:   Procedure Laterality Date    CARDIAC

## 2023-10-23 NOTE — PATIENT INSTRUCTIONS
Return in about 6 months (around 4/23/2024) for Dr. Braulio Allen. Blood Pressure goal is less than 140/90     May take antihistamines for sinus congestion/ allergies such as Zyrtec, Claritin, Allegra, and Benadryl. Avoid decongestants such as Sudafed that may elevate blood pressure and heart rate.

## 2023-11-16 RX ORDER — RAMIPRIL 10 MG/1
CAPSULE ORAL
Qty: 30 CAPSULE | Refills: 5 | Status: SHIPPED | OUTPATIENT
Start: 2023-11-16

## 2024-04-25 ENCOUNTER — OFFICE VISIT (OUTPATIENT)
Dept: CARDIOLOGY CLINIC | Age: 79
End: 2024-04-25
Payer: MEDICARE

## 2024-04-25 VITALS
HEART RATE: 47 BPM | HEIGHT: 73 IN | SYSTOLIC BLOOD PRESSURE: 132 MMHG | DIASTOLIC BLOOD PRESSURE: 80 MMHG | WEIGHT: 173 LBS | BODY MASS INDEX: 22.93 KG/M2

## 2024-04-25 DIAGNOSIS — I25.2 HISTORY OF MI (MYOCARDIAL INFARCTION): ICD-10-CM

## 2024-04-25 DIAGNOSIS — I25.10 CORONARY ARTERY DISEASE INVOLVING NATIVE CORONARY ARTERY OF NATIVE HEART WITHOUT ANGINA PECTORIS: Primary | ICD-10-CM

## 2024-04-25 DIAGNOSIS — I10 ESSENTIAL HYPERTENSION: ICD-10-CM

## 2024-04-25 DIAGNOSIS — Z95.5 HISTORY OF CORONARY ARTERY STENT PLACEMENT: ICD-10-CM

## 2024-04-25 DIAGNOSIS — E78.2 MIXED HYPERLIPIDEMIA: ICD-10-CM

## 2024-04-25 PROCEDURE — 4004F PT TOBACCO SCREEN RCVD TLK: CPT | Performed by: INTERNAL MEDICINE

## 2024-04-25 PROCEDURE — 99214 OFFICE O/P EST MOD 30 MIN: CPT | Performed by: INTERNAL MEDICINE

## 2024-04-25 PROCEDURE — G8427 DOCREV CUR MEDS BY ELIG CLIN: HCPCS | Performed by: INTERNAL MEDICINE

## 2024-04-25 PROCEDURE — 1123F ACP DISCUSS/DSCN MKR DOCD: CPT | Performed by: INTERNAL MEDICINE

## 2024-04-25 PROCEDURE — 3075F SYST BP GE 130 - 139MM HG: CPT | Performed by: INTERNAL MEDICINE

## 2024-04-25 PROCEDURE — G8420 CALC BMI NORM PARAMETERS: HCPCS | Performed by: INTERNAL MEDICINE

## 2024-04-25 PROCEDURE — 3079F DIAST BP 80-89 MM HG: CPT | Performed by: INTERNAL MEDICINE

## 2024-04-25 PROCEDURE — 93000 ELECTROCARDIOGRAM COMPLETE: CPT | Performed by: INTERNAL MEDICINE

## 2024-04-25 ASSESSMENT — ENCOUNTER SYMPTOMS
GASTROINTESTINAL NEGATIVE: 1
VOMITING: 0
RESPIRATORY NEGATIVE: 1
EYES NEGATIVE: 1
DIARRHEA: 0
NAUSEA: 0

## 2024-04-25 NOTE — PROGRESS NOTES
[facility]                            Date:  4/25/2024  Patient: Andrea Bailey  Age:  78 y.o., 1945      Reason for evaluation:         SUBJECTIVE:    Returns today for follow-up assessment history of previous MI several stents overall doing well at this time.  Heart rate 47 mildly bradycardic but he appears to be asymptomatic denies fatigue dizziness syncope denies exertional dyspnea or chest pain.  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  No difficulty with medications reported.  He does not take sublingual nitroglycerin.  No other complaints or issues reported.  ECG tracing marked sinus bradycardia rate of 47 otherwise unremarkable.    Review of Systems   Constitutional: Negative.  Negative for chills, fever and unexpected weight change.   HENT: Negative.     Eyes: Negative.    Respiratory: Negative.  Negative for shortness of breath.    Cardiovascular: Negative.  Negative for chest pain.   Gastrointestinal: Negative.  Negative for diarrhea, nausea and vomiting.   Endocrine: Negative.    Genitourinary: Negative.    Musculoskeletal: Negative.    Skin: Negative.    Neurological: Negative.    All other systems reviewed and are negative.        OBJECTIVE:     /80   Pulse (!) 47   Ht 1.854 m (6' 1\")   Wt 78.5 kg (173 lb)   BMI 22.82 kg/m²     Labs:   CBC: No results for input(s): \"WBC\", \"HGB\", \"HCT\", \"PLT\" in the last 72 hours.  BMP:No results for input(s): \"NA\", \"K\", \"CO2\", \"BUN\", \"CREATININE\", \"LABGLOM\", \"GLUCOSE\" in the last 72 hours.  BNP: No results for input(s): \"BNP\" in the last 72 hours.  PT/INR: No results for input(s): \"PROTIME\", \"INR\" in the last 72 hours.  APTT:No results for input(s): \"APTT\" in the last 72 hours.  CARDIAC ENZYMES:No results for input(s): \"CKTOTAL\", \"CKMB\", \"CKMBINDEX\", \"TROPONINI\" in the last 72 hours.  FASTING LIPID PANEL:  Lab Results   Component Value Date/Time    HDL 47 10/05/2020 10:44 AM    LDLDIRECT 70 12/02/2015 08:55 AM

## 2024-10-21 ENCOUNTER — OFFICE VISIT (OUTPATIENT)
Dept: CARDIOLOGY CLINIC | Age: 79
End: 2024-10-21
Payer: MEDICARE

## 2024-10-21 VITALS
DIASTOLIC BLOOD PRESSURE: 86 MMHG | OXYGEN SATURATION: 96 % | HEIGHT: 73 IN | WEIGHT: 173 LBS | HEART RATE: 55 BPM | BODY MASS INDEX: 22.93 KG/M2 | SYSTOLIC BLOOD PRESSURE: 134 MMHG

## 2024-10-21 DIAGNOSIS — I25.10 CORONARY ARTERY DISEASE INVOLVING NATIVE CORONARY ARTERY OF NATIVE HEART WITHOUT ANGINA PECTORIS: Primary | ICD-10-CM

## 2024-10-21 DIAGNOSIS — I10 ESSENTIAL HYPERTENSION: ICD-10-CM

## 2024-10-21 DIAGNOSIS — E78.2 MIXED HYPERLIPIDEMIA: ICD-10-CM

## 2024-10-21 DIAGNOSIS — I25.2 HISTORY OF MI (MYOCARDIAL INFARCTION): ICD-10-CM

## 2024-10-21 PROCEDURE — 4004F PT TOBACCO SCREEN RCVD TLK: CPT | Performed by: CLINICAL NURSE SPECIALIST

## 2024-10-21 PROCEDURE — G8484 FLU IMMUNIZE NO ADMIN: HCPCS | Performed by: CLINICAL NURSE SPECIALIST

## 2024-10-21 PROCEDURE — 99214 OFFICE O/P EST MOD 30 MIN: CPT | Performed by: CLINICAL NURSE SPECIALIST

## 2024-10-21 PROCEDURE — G8420 CALC BMI NORM PARAMETERS: HCPCS | Performed by: CLINICAL NURSE SPECIALIST

## 2024-10-21 PROCEDURE — 3079F DIAST BP 80-89 MM HG: CPT | Performed by: CLINICAL NURSE SPECIALIST

## 2024-10-21 PROCEDURE — G8427 DOCREV CUR MEDS BY ELIG CLIN: HCPCS | Performed by: CLINICAL NURSE SPECIALIST

## 2024-10-21 PROCEDURE — 3075F SYST BP GE 130 - 139MM HG: CPT | Performed by: CLINICAL NURSE SPECIALIST

## 2024-10-21 PROCEDURE — 1123F ACP DISCUSS/DSCN MKR DOCD: CPT | Performed by: CLINICAL NURSE SPECIALIST

## 2024-10-21 ASSESSMENT — ENCOUNTER SYMPTOMS
ABDOMINAL PAIN: 0
SHORTNESS OF BREATH: 0
CHEST TIGHTNESS: 0
FACIAL SWELLING: 0
EYE REDNESS: 0
NAUSEA: 0
COUGH: 0
WHEEZING: 0
VOMITING: 0

## 2024-10-21 NOTE — PROGRESS NOTES
Nose normal.   Eyes:      General:         Right eye: No discharge.         Left eye: No discharge.      Pupils: Pupils are equal, round, and reactive to light.   Neck:      Thyroid: No thyromegaly.      Vascular: No carotid bruit or JVD.      Trachea: No tracheal deviation.   Cardiovascular:      Rate and Rhythm: Regular rhythm. Bradycardia present.      Heart sounds: Normal heart sounds. No murmur heard.     No friction rub. No gallop.   Pulmonary:      Effort: Pulmonary effort is normal. No respiratory distress.      Breath sounds: Normal breath sounds. No wheezing or rales.   Abdominal:      Palpations: Abdomen is soft.      Tenderness: There is no abdominal tenderness.   Musculoskeletal:         General: No swelling or deformity.      Cervical back: Neck supple. No muscular tenderness.      Right lower leg: No edema.      Left lower leg: No edema.   Skin:     General: Skin is warm and dry.      Findings: No rash.   Neurological:      General: No focal deficit present.      Mental Status: He is alert and oriented to person, place, and time.      Cranial Nerves: No cranial nerve deficit.   Psychiatric:         Mood and Affect: Mood normal.         Behavior: Behavior normal.         Judgment: Judgment normal.         Data:  Lab Results   Component Value Date/Time    WBC 4.9 09/11/2020 02:45 AM    RBC 2.43 09/11/2020 02:45 AM    HGB 8.1 09/11/2020 02:45 AM    HCT 24.8 09/11/2020 02:45 AM     09/11/2020 02:45 AM      Lab Results   Component Value Date/Time    CHOL 152 10/05/2020 10:44 AM    TRIG 116 10/05/2020 10:44 AM    HDL 47 10/05/2020 10:44 AM     Lab Results   Component Value Date/Time     10/05/2020 10:44 AM    K 4.1 10/05/2020 10:44 AM    K 3.5 09/11/2020 02:45 AM     10/05/2020 10:44 AM    CO2 27 10/05/2020 10:44 AM    GLUCOSE 94 10/05/2020 10:44 AM    BUN 13 10/05/2020 10:44 AM    CREATININE 0.8 10/05/2020 10:44 AM    CALCIUM 10.1 10/05/2020 10:44 AM    ALT 19 10/05/2020 10:44 AM    AST

## 2024-10-23 RX ORDER — RAMIPRIL 10 MG/1
CAPSULE ORAL
Qty: 30 CAPSULE | Refills: 5 | Status: SHIPPED | OUTPATIENT
Start: 2024-10-23

## 2025-02-27 NOTE — PROGRESS NOTES
Physical Therapy  Name: Osiel Wiley  MRN:  [id number]  Date of service:  9/11/2020 09/11/20 1055   Subjective   Subjective Pt agrees to work with therapy   General Comment   Comments Pt found to be dirty with bowel movment in brief. Bed Mobility   Supine to Sit Moderate assistance  (x2)   Sit to Supine Moderate assistance  (x2)   Scooting Maximal assistance   Transfers   Sit to Stand Minimal Assistance; Moderate Assistance;2 Person Assistance   Stand to sit Minimal Assistance; Moderate Assistance;2 Person Assistance   Comment Pt stood 3x for clean up and able to stand for several minutes each time. Ambulation   Ambulation? Yes   WB Status FWBAT RLE   Ambulation 1   Surface level tile   Comments Pt attempted to take steps but was unable to move feet enough to get to the recliner. Pt was fatiqued from standing for clean up. Pt placed back to bed. Short term goals   Time Frame for Short term goals 2 wks   Short term goal 1 supine to sit indep   Short term goal 2 sit to stand indep   Short term goal 3 amb. 100' with RW SBA, FWBAT RLE. Conditions Requiring Skilled Therapeutic Intervention   Body structures, Functions, Activity limitations Decreased functional mobility ; Decreased ROM; Decreased strength;Decreased endurance;Decreased cognition;Decreased safe awareness;Decreased balance;Decreased coordination; Increased pain;Decreased posture   Assessment Pt shows increased endurance for standing. Activity Tolerance   Activity Tolerance Patient limited by endurance; Patient Tolerated treatment well   Safety Devices   Type of devices Left in bed;Call light within reach         Electronically signed by Jeannett Babinski, PTA on 9/11/2020 at 10:59 AM n/a

## 2025-04-23 RX ORDER — RAMIPRIL 10 MG/1
CAPSULE ORAL
Qty: 30 CAPSULE | Refills: 0 | Status: SHIPPED | OUTPATIENT
Start: 2025-04-23

## 2025-05-01 ENCOUNTER — OFFICE VISIT (OUTPATIENT)
Dept: CARDIOLOGY CLINIC | Age: 80
End: 2025-05-01
Payer: MEDICARE

## 2025-05-01 VITALS
BODY MASS INDEX: 22.8 KG/M2 | SYSTOLIC BLOOD PRESSURE: 120 MMHG | DIASTOLIC BLOOD PRESSURE: 82 MMHG | HEIGHT: 73 IN | HEART RATE: 53 BPM | WEIGHT: 172 LBS

## 2025-05-01 DIAGNOSIS — R06.02 SHORTNESS OF BREATH: ICD-10-CM

## 2025-05-01 DIAGNOSIS — I10 ESSENTIAL HYPERTENSION: ICD-10-CM

## 2025-05-01 DIAGNOSIS — Z95.5 HISTORY OF CORONARY ARTERY STENT PLACEMENT: ICD-10-CM

## 2025-05-01 DIAGNOSIS — R07.9 CHEST PAIN, UNSPECIFIED TYPE: Primary | ICD-10-CM

## 2025-05-01 DIAGNOSIS — I25.10 CORONARY ARTERY DISEASE INVOLVING NATIVE CORONARY ARTERY OF NATIVE HEART WITHOUT ANGINA PECTORIS: ICD-10-CM

## 2025-05-01 DIAGNOSIS — I25.2 HISTORY OF MI (MYOCARDIAL INFARCTION): ICD-10-CM

## 2025-05-01 DIAGNOSIS — E78.2 MIXED HYPERLIPIDEMIA: ICD-10-CM

## 2025-05-01 PROCEDURE — 3074F SYST BP LT 130 MM HG: CPT | Performed by: INTERNAL MEDICINE

## 2025-05-01 PROCEDURE — 4004F PT TOBACCO SCREEN RCVD TLK: CPT | Performed by: INTERNAL MEDICINE

## 2025-05-01 PROCEDURE — G8420 CALC BMI NORM PARAMETERS: HCPCS | Performed by: INTERNAL MEDICINE

## 2025-05-01 PROCEDURE — 99213 OFFICE O/P EST LOW 20 MIN: CPT | Performed by: INTERNAL MEDICINE

## 2025-05-01 PROCEDURE — G8427 DOCREV CUR MEDS BY ELIG CLIN: HCPCS | Performed by: INTERNAL MEDICINE

## 2025-05-01 PROCEDURE — 1159F MED LIST DOCD IN RCRD: CPT | Performed by: INTERNAL MEDICINE

## 2025-05-01 PROCEDURE — 1123F ACP DISCUSS/DSCN MKR DOCD: CPT | Performed by: INTERNAL MEDICINE

## 2025-05-01 PROCEDURE — 3079F DIAST BP 80-89 MM HG: CPT | Performed by: INTERNAL MEDICINE

## 2025-05-01 PROCEDURE — 93000 ELECTROCARDIOGRAM COMPLETE: CPT | Performed by: INTERNAL MEDICINE

## 2025-05-01 NOTE — PROGRESS NOTES
[facility] Progress [facility]                            Date:  5/1/2025  Patient: Andrea Bailey  Age:  79 y.o., 1945      Reason for evaluation:         SUBJECTIVE:    Seen today resting comfortably for coronary artery disease hypertension hyperlipidemia previous stent previous MI overall doing well denies chest pain dyspnea palpitations or other complaints.  Blood pressure 120 bradycardia chart 53.    Review of Systems      OBJECTIVE:    /82   Pulse 53   Ht 1.854 m (6' 1\")   Wt 78 kg (172 lb)   BMI 22.69 kg/m²     Labs:   CBC: No results for input(s): \"WBC\", \"HGB\", \"HCT\", \"PLT\" in the last 72 hours.  BMP:No results for input(s): \"NA\", \"K\", \"CO2\", \"BUN\", \"CREATININE\", \"LABGLOM\", \"GLUCOSE\" in the last 72 hours.  BNP: No results for input(s): \"BNP\" in the last 72 hours.  PT/INR: No results for input(s): \"PROTIME\", \"INR\" in the last 72 hours.  APTT:No results for input(s): \"APTT\" in the last 72 hours.  CARDIAC ENZYMES:No results for input(s): \"CKTOTAL\", \"CKMB\", \"CKMBINDEX\", \"TROPONINI\" in the last 72 hours.  FASTING LIPID PANEL:  Lab Results   Component Value Date/Time    HDL 47 10/05/2020 10:44 AM    LDLDIRECT 70 12/02/2015 08:55 AM    TRIG 116 10/05/2020 10:44 AM     LIVER PROFILE:No results for input(s): \"AST\", \"ALT\", \"LABALBU\" in the last 72 hours.        Past Medical History:   Diagnosis Date    CAD (coronary artery disease)     Hyperlipidemia     Dr. breaux    Hypertension     subendocardial    Myocardial infarction (HCC)     Tobacco abuse      Past Surgical History:   Procedure Laterality Date    CARDIAC CATHETERIZATION  2002    EF in excess of 50%    CARDIAC CATHETERIZATION  12/06/2016    Two bare metal stents to right coronary artery.  KISHORE Harris M.D.    COLONOSCOPY  2017    \"Normal\" per patient     CORONARY ANGIOPLASTY WITH STENT PLACEMENT      DIAGNOSTIC CARDIAC CATH LAB PROCEDURE      HERNIA REPAIR      multiple: 03/25/02 right groin, had done both sides ~1980    HIP

## 2025-06-02 RX ORDER — RAMIPRIL 10 MG/1
CAPSULE ORAL
Qty: 30 CAPSULE | Refills: 5 | Status: SHIPPED | OUTPATIENT
Start: 2025-06-02

## (undated) DEVICE — STERILE LATEX POWDER FREE SURGICAL GLOVES WITH HYDROGEL COATING: Brand: PROTEXIS

## (undated) DEVICE — DUAL CUT SAGITTAL BLADE

## (undated) DEVICE — SUTURE VCRL SZ 2-0 L18IN ABSRB UD CT-1 L36MM 1/2 CIR J839D

## (undated) DEVICE — BANDAGE COMPR W6INXL10YD ST M E WHITE/BEIGE

## (undated) DEVICE — TUBE ET 7.5MM NSL ORAL BASIC CUF INTMED MURPHY EYE RADPQ

## (undated) DEVICE — STERILE POLYISOPRENE POWDER-FREE SURGICAL GLOVES: Brand: PROTEXIS

## (undated) DEVICE — ADHESIVE SKIN CLSR 0.7ML TOP DERMBND ADV

## (undated) DEVICE — Z INACTIVE USE 2660664 SOLUTION IRRIG 3000ML 0.9% SOD CHL USP UROMATIC PLAS CONT

## (undated) DEVICE — SURGICAL PROCEDURE PACK HIP TOT DBD CDS LOURDES HOSP LTX

## (undated) DEVICE — LARYNGOSCOPE BLDE MAC HNDL M SZ 35 ST CURAPLEX CURAVIEW LED

## (undated) DEVICE — SUTURE VCRL SZ 0 L18IN ABSRB UD L36MM CT-1 1/2 CIR J840D

## (undated) DEVICE — SPLINT ORTH 22IN KNEE BASIC

## (undated) DEVICE — SOLUTION IV IRRIG POUR BRL 0.9% SODIUM CHL 2F7124